# Patient Record
Sex: MALE | Race: WHITE | NOT HISPANIC OR LATINO | Employment: FULL TIME | URBAN - METROPOLITAN AREA
[De-identification: names, ages, dates, MRNs, and addresses within clinical notes are randomized per-mention and may not be internally consistent; named-entity substitution may affect disease eponyms.]

---

## 2023-07-10 ENCOUNTER — LAB VISIT (OUTPATIENT)
Dept: LAB | Facility: HOSPITAL | Age: 53
End: 2023-07-10
Attending: INTERNAL MEDICINE
Payer: COMMERCIAL

## 2023-07-10 ENCOUNTER — OFFICE VISIT (OUTPATIENT)
Dept: HEMATOLOGY/ONCOLOGY | Facility: CLINIC | Age: 53
End: 2023-07-10
Payer: COMMERCIAL

## 2023-07-10 VITALS
SYSTOLIC BLOOD PRESSURE: 115 MMHG | TEMPERATURE: 97 F | WEIGHT: 153.88 LBS | OXYGEN SATURATION: 98 % | HEIGHT: 68 IN | BODY MASS INDEX: 23.32 KG/M2 | HEART RATE: 81 BPM | DIASTOLIC BLOOD PRESSURE: 78 MMHG

## 2023-07-10 DIAGNOSIS — E78.5 HYPERLIPIDEMIA, UNSPECIFIED HYPERLIPIDEMIA TYPE: ICD-10-CM

## 2023-07-10 DIAGNOSIS — D69.6 THROMBOCYTOPENIA: ICD-10-CM

## 2023-07-10 DIAGNOSIS — K76.9 LIVER DISEASE, UNSPECIFIED: ICD-10-CM

## 2023-07-10 DIAGNOSIS — R74.01 TRANSAMINITIS: ICD-10-CM

## 2023-07-10 DIAGNOSIS — Z72.0 TOBACCO USE: ICD-10-CM

## 2023-07-10 DIAGNOSIS — K76.9 LIVER LESION: ICD-10-CM

## 2023-07-10 DIAGNOSIS — K76.9 LIVER LESION: Primary | ICD-10-CM

## 2023-07-10 DIAGNOSIS — F10.10 ALCOHOL ABUSE: ICD-10-CM

## 2023-07-10 DIAGNOSIS — I10 HYPERTENSION, UNSPECIFIED TYPE: ICD-10-CM

## 2023-07-10 LAB
AFP SERPL-MCNC: 2.4 NG/ML (ref 0–8.4)
ALBUMIN SERPL BCP-MCNC: 3.5 G/DL (ref 3.5–5.2)
ALP SERPL-CCNC: 89 U/L (ref 55–135)
ALT SERPL W/O P-5'-P-CCNC: 133 U/L (ref 10–44)
ANION GAP SERPL CALC-SCNC: 10 MMOL/L (ref 8–16)
AST SERPL-CCNC: 75 U/L (ref 10–40)
BASOPHILS # BLD AUTO: 0.04 K/UL (ref 0–0.2)
BASOPHILS NFR BLD: 0.7 % (ref 0–1.9)
BILIRUB SERPL-MCNC: 0.5 MG/DL (ref 0.1–1)
BUN SERPL-MCNC: 15 MG/DL (ref 6–20)
CALCIUM SERPL-MCNC: 9.4 MG/DL (ref 8.7–10.5)
CHLORIDE SERPL-SCNC: 104 MMOL/L (ref 95–110)
CO2 SERPL-SCNC: 24 MMOL/L (ref 23–29)
CREAT SERPL-MCNC: 0.9 MG/DL (ref 0.5–1.4)
DIFFERENTIAL METHOD: ABNORMAL
EOSINOPHIL # BLD AUTO: 0 K/UL (ref 0–0.5)
EOSINOPHIL NFR BLD: 0.5 % (ref 0–8)
ERYTHROCYTE [DISTWIDTH] IN BLOOD BY AUTOMATED COUNT: 14.6 % (ref 11.5–14.5)
EST. GFR  (NO RACE VARIABLE): >60 ML/MIN/1.73 M^2
GLUCOSE SERPL-MCNC: 111 MG/DL (ref 70–110)
HCT VFR BLD AUTO: 42.3 % (ref 40–54)
HGB BLD-MCNC: 14 G/DL (ref 14–18)
IMM GRANULOCYTES # BLD AUTO: 0.05 K/UL (ref 0–0.04)
IMM GRANULOCYTES NFR BLD AUTO: 0.9 % (ref 0–0.5)
INR PPP: 0.9 (ref 0.8–1.2)
LYMPHOCYTES # BLD AUTO: 2 K/UL (ref 1–4.8)
LYMPHOCYTES NFR BLD: 34.6 % (ref 18–48)
MCH RBC QN AUTO: 32.6 PG (ref 27–31)
MCHC RBC AUTO-ENTMCNC: 33.1 G/DL (ref 32–36)
MCV RBC AUTO: 98 FL (ref 82–98)
MONOCYTES # BLD AUTO: 0.7 K/UL (ref 0.3–1)
MONOCYTES NFR BLD: 11.8 % (ref 4–15)
NEUTROPHILS # BLD AUTO: 3 K/UL (ref 1.8–7.7)
NEUTROPHILS NFR BLD: 51.5 % (ref 38–73)
NRBC BLD-RTO: 0 /100 WBC
PATH REV BLD -IMP: NORMAL
PLATELET # BLD AUTO: 87 K/UL (ref 150–450)
PMV BLD AUTO: 10.5 FL (ref 9.2–12.9)
POTASSIUM SERPL-SCNC: 4.9 MMOL/L (ref 3.5–5.1)
PROT SERPL-MCNC: 7.7 G/DL (ref 6–8.4)
PROTHROMBIN TIME: 9.9 SEC (ref 9–12.5)
RBC # BLD AUTO: 4.3 M/UL (ref 4.6–6.2)
SODIUM SERPL-SCNC: 138 MMOL/L (ref 136–145)
WBC # BLD AUTO: 5.75 K/UL (ref 3.9–12.7)

## 2023-07-10 PROCEDURE — 1159F MED LIST DOCD IN RCRD: CPT | Mod: CPTII,S$GLB,, | Performed by: INTERNAL MEDICINE

## 2023-07-10 PROCEDURE — 99999 PR PBB SHADOW E&M-NEW PATIENT-LVL IV: ICD-10-PCS | Mod: PBBFAC,,, | Performed by: INTERNAL MEDICINE

## 2023-07-10 PROCEDURE — 99999 PR PBB SHADOW E&M-NEW PATIENT-LVL IV: CPT | Mod: PBBFAC,,, | Performed by: INTERNAL MEDICINE

## 2023-07-10 PROCEDURE — 85610 PROTHROMBIN TIME: CPT | Performed by: INTERNAL MEDICINE

## 2023-07-10 PROCEDURE — 99205 PR OFFICE/OUTPT VISIT, NEW, LEVL V, 60-74 MIN: ICD-10-PCS | Mod: S$GLB,,, | Performed by: INTERNAL MEDICINE

## 2023-07-10 PROCEDURE — 99417 PROLNG OP E/M EACH 15 MIN: CPT | Mod: S$GLB,,, | Performed by: INTERNAL MEDICINE

## 2023-07-10 PROCEDURE — 80053 COMPREHEN METABOLIC PANEL: CPT | Performed by: INTERNAL MEDICINE

## 2023-07-10 PROCEDURE — 85060 BLOOD SMEAR INTERPRETATION: CPT | Mod: ,,, | Performed by: PATHOLOGY

## 2023-07-10 PROCEDURE — 4010F ACE/ARB THERAPY RXD/TAKEN: CPT | Mod: CPTII,S$GLB,, | Performed by: INTERNAL MEDICINE

## 2023-07-10 PROCEDURE — 99417 PR PROLONGED SVC, OUTPT, W/WO DIRECT PT CONTACT,  EA ADDTL 15 MIN: ICD-10-PCS | Mod: S$GLB,,, | Performed by: INTERNAL MEDICINE

## 2023-07-10 PROCEDURE — 3074F PR MOST RECENT SYSTOLIC BLOOD PRESSURE < 130 MM HG: ICD-10-PCS | Mod: CPTII,S$GLB,, | Performed by: INTERNAL MEDICINE

## 2023-07-10 PROCEDURE — 36415 COLL VENOUS BLD VENIPUNCTURE: CPT | Performed by: INTERNAL MEDICINE

## 2023-07-10 PROCEDURE — 3074F SYST BP LT 130 MM HG: CPT | Mod: CPTII,S$GLB,, | Performed by: INTERNAL MEDICINE

## 2023-07-10 PROCEDURE — 3008F BODY MASS INDEX DOCD: CPT | Mod: CPTII,S$GLB,, | Performed by: INTERNAL MEDICINE

## 2023-07-10 PROCEDURE — 1160F PR REVIEW ALL MEDS BY PRESCRIBER/CLIN PHARMACIST DOCUMENTED: ICD-10-PCS | Mod: CPTII,S$GLB,, | Performed by: INTERNAL MEDICINE

## 2023-07-10 PROCEDURE — 3078F DIAST BP <80 MM HG: CPT | Mod: CPTII,S$GLB,, | Performed by: INTERNAL MEDICINE

## 2023-07-10 PROCEDURE — 3078F PR MOST RECENT DIASTOLIC BLOOD PRESSURE < 80 MM HG: ICD-10-PCS | Mod: CPTII,S$GLB,, | Performed by: INTERNAL MEDICINE

## 2023-07-10 PROCEDURE — 85025 COMPLETE CBC W/AUTO DIFF WBC: CPT | Performed by: INTERNAL MEDICINE

## 2023-07-10 PROCEDURE — 82105 ALPHA-FETOPROTEIN SERUM: CPT | Performed by: INTERNAL MEDICINE

## 2023-07-10 PROCEDURE — 4010F PR ACE/ARB THEARPY RXD/TAKEN: ICD-10-PCS | Mod: CPTII,S$GLB,, | Performed by: INTERNAL MEDICINE

## 2023-07-10 PROCEDURE — 3008F PR BODY MASS INDEX (BMI) DOCUMENTED: ICD-10-PCS | Mod: CPTII,S$GLB,, | Performed by: INTERNAL MEDICINE

## 2023-07-10 PROCEDURE — 99205 OFFICE O/P NEW HI 60 MIN: CPT | Mod: S$GLB,,, | Performed by: INTERNAL MEDICINE

## 2023-07-10 PROCEDURE — 85060 PATHOLOGIST REVIEW: ICD-10-PCS | Mod: ,,, | Performed by: PATHOLOGY

## 2023-07-10 PROCEDURE — 1159F PR MEDICATION LIST DOCUMENTED IN MEDICAL RECORD: ICD-10-PCS | Mod: CPTII,S$GLB,, | Performed by: INTERNAL MEDICINE

## 2023-07-10 PROCEDURE — 1160F RVW MEDS BY RX/DR IN RCRD: CPT | Mod: CPTII,S$GLB,, | Performed by: INTERNAL MEDICINE

## 2023-07-10 RX ORDER — LISINOPRIL 10 MG/1
10 TABLET ORAL DAILY
COMMUNITY
Start: 2023-04-19

## 2023-07-10 RX ORDER — PREDNISONE 10 MG/1
10 TABLET ORAL 2 TIMES DAILY
COMMUNITY
Start: 2023-07-07 | End: 2023-08-23

## 2023-07-10 NOTE — PROGRESS NOTES
Subjective:      DATE OF VISIT: 7/10/23     ?  Patient ID:?Juwan Martin is a 52 y.o. male.?? MR#: [id number]   ?   REFERRING PROVIDER: NJ Garcia  [address] [place] [address]     ? Primary Care Providers:  Primary Doctor No (General)     CHIEF COMPLAINT:  Thrombocytopenia, liver lesion abnormality on outside imaging????   ?   ONCOLOGIC DIAGNOSIS:  TBD  ?   CURRENT TREATMENT:  None    PAST TREATMENT:  None  ?   ONCOLOGIC HISTORY:   ?   Oncology History    No history exists.     Cancer Staging   No matching staging information was found for the patient.       HPI    Mr. Martin is a 52-year-old man with tobacco use, alcohol use 15 drinks per day for over 30 years, hyperlipidemia, hypertension without routine medical follow-up.  He had in May 2023 nasal laser procedure since then sinus infection and persistent fatigue for which he went to nurse practitioner.  Labs showed thrombocytopenia platelet count 30s K. He denies any evidence of bleeding.  He does endorse 20 lb weight loss over last 2 months.      Review of Systems    ?   A comprehensive 14-point review of systems was reviewed with patient and was negative other than as specified above.   ?   PAST MEDICAL HISTORY:   History reviewed. No pertinent past medical history. ?     PAST SURGICAL HISTORY:   No past surgical history on file.   ?   ALLERGIES:   Allergies as of 07/10/2023    (No Known Allergies)      ?   MEDICATIONS:?   Outpatient Medications Marked as Taking for the 7/10/23 encounter (Office Visit) with Charlotte Baltazar MD   Medication Sig Dispense Refill    lisinopriL 10 MG tablet Take 10 mg by mouth once daily.      predniSONE (DELTASONE) 10 MG tablet Take 10 mg by mouth 2 (two) times daily.        ?   SOCIAL HISTORY:?   Social History     Tobacco Use    Smoking status: Not on file    Smokeless tobacco: Not on file   Substance Use Topics    Alcohol use: Not on file      ?   Chewing tobacco   Fifteen drinks per day since 21 years  old   ?   FAMILY HISTORY:   family history is not on file.   ?        Objective:      Physical Exam      ?   Vitals:    07/10/23 1444   BP: 115/78   Pulse: 81   Temp: 97.2 °F (36.2 °C)      ?   ECOG:?0   General appearance: Generally well appearing, in no acute distress.   Head, eyes, ears, nose, and throat: Pupils round and equally reactive to light. Oropharynx clear with moist mucous membranes.  Facial erythema  Abdomen: Bowel sounds present, soft, nontender, nondistended.   Extremities: Warm, without edema.   Neurologic: Alert and oriented. Grossly normal strength, coordination, and gait.   Skin: No rashes, ecchymoses or petechial lesion.   ?      ?   Laboratory:  ?   No visits with results within 1 Day(s) from this visit.   Latest known visit with results is:   No results found for any previous visit.    Scan from outside June 2023  ?   Tumor markers   ? AFP pending  ?   Imaging:  ?  Scanned from outside ultrasound and CT June 2023  No results found for this or any previous visit (from the past 2160 hour(s)).  No results found for this or any previous visit (from the past 2160 hour(s)).  No results found for this or any previous visit (from the past 2160 hour(s)).      Pathology:    None     ?   Assessment/Plan:   Liver lesion  -     AFP - tumor marker; Future; Expected date: 07/10/2023  -     CBC W/ AUTO DIFFERENTIAL; Future; Expected date: 07/10/2023  -     Comprehensive Metabolic Panel; Future; Expected date: 07/10/2023  -     PT/INR; Future; Expected date: 07/10/2023    Thrombocytopenia  -     Pathologist Interpretation Differential; Future; Expected date: 07/10/2023    Alcohol abuse    Tobacco use    Hyperlipidemia, unspecified hyperlipidemia type    Hypertension, unspecified type    Transaminitis    Liver disease, unspecified  -     MRI Abdomen W WO Contrast; Future; Expected date: 07/10/2023       1. Liver lesion    2. Thrombocytopenia    3. Alcohol abuse    4. Tobacco use    5. Hyperlipidemia,  unspecified hyperlipidemia type    6. Hypertension, unspecified type    7. Transaminitis    8. Liver disease, unspecified          Plan:     Problem List Items Addressed This Visit    None  Visit Diagnoses       Liver lesion    -  Primary    Thrombocytopenia        Alcohol abuse        Tobacco use        Hyperlipidemia, unspecified hyperlipidemia type        Hypertension, unspecified type        Transaminitis        Liver disease, unspecified              Severe thrombocytopenia platelet count 30s K mild neutropenia in setting of transaminitis and significant alcohol use 15 drinks per day times 30 years, imaging findings possible hepatocellular steatosis unclear if cirrhosis or liver lesion concerning for liver disease associated thrombocytopenia.  No bleeding complication.    Given unclear imaging findings on ultrasound and follow-up CT on outside (do not have CD imaging) recommend MRI for follow-up.  I discussed risk of cirrhosis and association with malignancy.  He would benefit from establishing care with hepatology and will place consult.  Will get tumor marker AFP.  Concerning weight loss 20 lb over last 2 months.    Alcohol and tobacco cessation recommended.              Follow-Up:   Route Chart for Scheduling    Med Onc Chart Routing      Follow up with physician 2 weeks. after scan afternoon   Follow up with VERNELL    Infusion scheduling note    Injection scheduling note    Labs Other   Scheduling:  Preferred lab:  Lab interval:  labs today   Imaging MRI   early appt and fup with MD same day in afternoon due to travel   Pharmacy appointment    Other referrals           75 minutes of total time spent on the encounter, which includes face to face time and non-face to face time preparing to see the patient (eg, review of tests), Obtaining and/or reviewing separately obtained history, Documenting clinical information in the electronic or other health record, Independently interpreting results (not separately  reported) and communicating results to the patient/family/caregiver, or Care coordination (not separately reported).

## 2023-07-11 LAB — PATH REV BLD -IMP: NORMAL

## 2023-07-28 ENCOUNTER — OFFICE VISIT (OUTPATIENT)
Dept: HEMATOLOGY/ONCOLOGY | Facility: CLINIC | Age: 53
End: 2023-07-28
Payer: COMMERCIAL

## 2023-07-28 ENCOUNTER — HOSPITAL ENCOUNTER (OUTPATIENT)
Dept: RADIOLOGY | Facility: HOSPITAL | Age: 53
Discharge: HOME | End: 2023-07-28
Attending: INTERNAL MEDICINE
Payer: COMMERCIAL

## 2023-07-28 VITALS
DIASTOLIC BLOOD PRESSURE: 68 MMHG | HEIGHT: 67 IN | TEMPERATURE: 97 F | OXYGEN SATURATION: 98 % | HEART RATE: 93 BPM | SYSTOLIC BLOOD PRESSURE: 106 MMHG | BODY MASS INDEX: 23.36 KG/M2 | WEIGHT: 148.81 LBS

## 2023-07-28 DIAGNOSIS — D69.6 THROMBOCYTOPENIA: Primary | ICD-10-CM

## 2023-07-28 DIAGNOSIS — K76.9 LIVER DISEASE, UNSPECIFIED: ICD-10-CM

## 2023-07-28 DIAGNOSIS — R63.4 UNINTENTIONAL WEIGHT LOSS: ICD-10-CM

## 2023-07-28 PROCEDURE — 3074F SYST BP LT 130 MM HG: CPT | Mod: CPTII,S$GLB,, | Performed by: INTERNAL MEDICINE

## 2023-07-28 PROCEDURE — 99215 OFFICE O/P EST HI 40 MIN: CPT | Mod: S$GLB,,, | Performed by: INTERNAL MEDICINE

## 2023-07-28 PROCEDURE — 4010F ACE/ARB THERAPY RXD/TAKEN: CPT | Mod: CPTII,S$GLB,, | Performed by: INTERNAL MEDICINE

## 2023-07-28 PROCEDURE — 74183 MRI ABD W/O CNTR FLWD CNTR: CPT | Mod: 26,,, | Performed by: RADIOLOGY

## 2023-07-28 PROCEDURE — A9585 GADOBUTROL INJECTION: HCPCS | Performed by: INTERNAL MEDICINE

## 2023-07-28 PROCEDURE — 99999 PR PBB SHADOW E&M-EST. PATIENT-LVL III: ICD-10-PCS | Mod: PBBFAC,,, | Performed by: INTERNAL MEDICINE

## 2023-07-28 PROCEDURE — 3078F DIAST BP <80 MM HG: CPT | Mod: CPTII,S$GLB,, | Performed by: INTERNAL MEDICINE

## 2023-07-28 PROCEDURE — 4010F PR ACE/ARB THEARPY RXD/TAKEN: ICD-10-PCS | Mod: CPTII,S$GLB,, | Performed by: INTERNAL MEDICINE

## 2023-07-28 PROCEDURE — 99999 PR PBB SHADOW E&M-EST. PATIENT-LVL III: CPT | Mod: PBBFAC,,, | Performed by: INTERNAL MEDICINE

## 2023-07-28 PROCEDURE — 3078F PR MOST RECENT DIASTOLIC BLOOD PRESSURE < 80 MM HG: ICD-10-PCS | Mod: CPTII,S$GLB,, | Performed by: INTERNAL MEDICINE

## 2023-07-28 PROCEDURE — 1159F PR MEDICATION LIST DOCUMENTED IN MEDICAL RECORD: ICD-10-PCS | Mod: CPTII,S$GLB,, | Performed by: INTERNAL MEDICINE

## 2023-07-28 PROCEDURE — 25500020 PHARM REV CODE 255: Performed by: INTERNAL MEDICINE

## 2023-07-28 PROCEDURE — 74183 MRI ABDOMEN W WO CONTRAST: ICD-10-PCS | Mod: 26,,, | Performed by: RADIOLOGY

## 2023-07-28 PROCEDURE — 3008F PR BODY MASS INDEX (BMI) DOCUMENTED: ICD-10-PCS | Mod: CPTII,S$GLB,, | Performed by: INTERNAL MEDICINE

## 2023-07-28 PROCEDURE — 3074F PR MOST RECENT SYSTOLIC BLOOD PRESSURE < 130 MM HG: ICD-10-PCS | Mod: CPTII,S$GLB,, | Performed by: INTERNAL MEDICINE

## 2023-07-28 PROCEDURE — 74183 MRI ABD W/O CNTR FLWD CNTR: CPT | Mod: TC

## 2023-07-28 PROCEDURE — 1159F MED LIST DOCD IN RCRD: CPT | Mod: CPTII,S$GLB,, | Performed by: INTERNAL MEDICINE

## 2023-07-28 PROCEDURE — 3008F BODY MASS INDEX DOCD: CPT | Mod: CPTII,S$GLB,, | Performed by: INTERNAL MEDICINE

## 2023-07-28 PROCEDURE — 99215 PR OFFICE/OUTPT VISIT, EST, LEVL V, 40-54 MIN: ICD-10-PCS | Mod: S$GLB,,, | Performed by: INTERNAL MEDICINE

## 2023-07-28 RX ORDER — GADOBUTROL 604.72 MG/ML
10 INJECTION INTRAVENOUS
Status: COMPLETED | OUTPATIENT
Start: 2023-07-28 | End: 2023-07-28

## 2023-07-28 RX ADMIN — GADOBUTROL 6 ML: 604.72 INJECTION INTRAVENOUS at 11:07

## 2023-07-28 NOTE — H&P (VIEW-ONLY)
Subjective:      DATE OF VISIT: 7/28/23     ?  Patient ID:?Juwan Martin is a 52 y.o. male.?? MR#: [id number]   ?   REFERRING PROVIDER: No referring provider defined for this encounter.     ? Primary Care Providers:  Primary Doctor No (General)     CHIEF COMPLAINT:  Thrombocytopenia, liver lesion abnormality on outside imaging????   ?   ONCOLOGIC DIAGNOSIS:  TBD  ?   CURRENT TREATMENT:  None    PAST TREATMENT:  None  ?   ONCOLOGIC HISTORY:   ?   Oncology History    No history exists.      Cancer Staging   No matching staging information was found for the patient.       HPI    Mr. Martin is a 52-year-old man with tobacco use, alcohol use 15 drinks per day for over 30 years, hyperlipidemia, hypertension without routine medical follow-up.  He had in May 2023 nasal laser procedure since then sinus infection and persistent fatigue for which he went to nurse practitioner.  Labs showed thrombocytopenia platelet count 30s K side.  20 lb weight loss over the prior 2 months.  Since our last visit he notes additional 5 lb unintentional weight loss continued fatigue.  Easy bruisability no other bleeding.  He had further workup with MRI abdomen tumor marker and CBC with pathology smear review.  August 2023 hepatology referral with Dr. Owens as planned.       Review of Systems    ?   A comprehensive 14-point review of systems was reviewed with patient and was negative other than as specified above.   ?   PAST MEDICAL HISTORY:   Past Medical History:   Diagnosis Date    Hyperlipidemia     Hypertension     ?     PAST SURGICAL HISTORY:   No past surgical history on file.   ?   ALLERGIES:   Allergies as of 07/28/2023    (No Known Allergies)      ?   MEDICATIONS:?   Outpatient Medications Marked as Taking for the 7/28/23 encounter (Office Visit) with Charlotte Baltazar MD   Medication Sig Dispense Refill    lisinopriL 10 MG tablet Take 10 mg by mouth once daily.        ?   SOCIAL HISTORY:?   Social History     Tobacco Use     Smoking status: Not on file    Smokeless tobacco: Current     Types: Chew   Substance Use Topics    Alcohol use: Yes     Alcohol/week: 15.0 standard drinks     Types: 15 Drinks containing 0.5 oz of alcohol per week      ?   Chewing tobacco   Fifteen drinks per day since 21 years old   ?   FAMILY HISTORY:   family history is not on file.   ?        Objective:      Physical Exam      ?   Vitals:    07/28/23 1419   BP: 106/68   Pulse: 93   Temp: 97.2 °F (36.2 °C)        ?   ECOG:?0   General appearance: Generally well appearing, in no acute distress.   Head, eyes, ears, nose, and throat: Pupils round and equally reactive to light. Oropharynx clear with moist mucous membranes.  Facial erythema  Abdomen: Bowel sounds present, soft, nontender, nondistended.   Extremities: Warm, without edema.   Neurologic: Alert and oriented. Grossly normal strength, coordination, and gait.   Skin: No rashes, ecchymoses or petechial lesion.   ?      ?   Laboratory:  ?   No visits with results within 1 Day(s) from this visit.   Latest known visit with results is:   Lab Visit on 07/10/2023   Component Date Value Ref Range Status    AFP 07/10/2023 2.4  0.0 - 8.4 ng/mL Final    WBC 07/10/2023 5.75  3.90 - 12.70 K/uL Final    RBC 07/10/2023 4.30 (L)  4.60 - 6.20 M/uL Final    Hemoglobin 07/10/2023 14.0  14.0 - 18.0 g/dL Final    Hematocrit 07/10/2023 42.3  40.0 - 54.0 % Final    MCV 07/10/2023 98  82 - 98 fL Final    MCH 07/10/2023 32.6 (H)  27.0 - 31.0 pg Final    MCHC 07/10/2023 33.1  32.0 - 36.0 g/dL Final    RDW 07/10/2023 14.6 (H)  11.5 - 14.5 % Final    Platelets 07/10/2023 87 (L)  150 - 450 K/uL Final    MPV 07/10/2023 10.5  9.2 - 12.9 fL Final    Immature Granulocytes 07/10/2023 0.9 (H)  0.0 - 0.5 % Final    Gran # (ANC) 07/10/2023 3.0  1.8 - 7.7 K/uL Final    Immature Grans (Abs) 07/10/2023 0.05 (H)  0.00 - 0.04 K/uL Final    Lymph # 07/10/2023 2.0  1.0 - 4.8 K/uL Final    Mono # 07/10/2023 0.7  0.3 - 1.0 K/uL Final    Eos #  07/10/2023 0.0  0.0 - 0.5 K/uL Final    Baso # 07/10/2023 0.04  0.00 - 0.20 K/uL Final    nRBC 07/10/2023 0  0 /100 WBC Final    Gran % 07/10/2023 51.5  38.0 - 73.0 % Final    Lymph % 07/10/2023 34.6  18.0 - 48.0 % Final    Mono % 07/10/2023 11.8  4.0 - 15.0 % Final    Eosinophil % 07/10/2023 0.5  0.0 - 8.0 % Final    Basophil % 07/10/2023 0.7  0.0 - 1.9 % Final    Differential Method 07/10/2023 Automated   Final    Sodium 07/10/2023 138  136 - 145 mmol/L Final    Potassium 07/10/2023 4.9  3.5 - 5.1 mmol/L Final    Chloride 07/10/2023 104  95 - 110 mmol/L Final    CO2 07/10/2023 24  23 - 29 mmol/L Final    Glucose 07/10/2023 111 (H)  70 - 110 mg/dL Final    BUN 07/10/2023 15  6 - 20 mg/dL Final    Creatinine 07/10/2023 0.9  0.5 - 1.4 mg/dL Final    Calcium 07/10/2023 9.4  8.7 - 10.5 mg/dL Final    Total Protein 07/10/2023 7.7  6.0 - 8.4 g/dL Final    Albumin 07/10/2023 3.5  3.5 - 5.2 g/dL Final    Total Bilirubin 07/10/2023 0.5  0.1 - 1.0 mg/dL Final    Alkaline Phosphatase 07/10/2023 89  55 - 135 U/L Final    AST 07/10/2023 75 (H)  10 - 40 U/L Final    ALT 07/10/2023 133 (H)  10 - 44 U/L Final    eGFR 07/10/2023 >60  >60 mL/min/1.73 m^2 Final    Anion Gap 07/10/2023 10  8 - 16 mmol/L Final    Prothrombin Time 07/10/2023 9.9  9.0 - 12.5 sec Final    INR 07/10/2023 0.9  0.8 - 1.2 Final    Pathologist Review 07/10/2023 Review required   Final    Pathologist Review Peripheral Smear 07/10/2023 REVIEWED   Final    Scan from outside June 2023  ?   Tumor markers   ? AFP pending  ?   Imaging:  ?  Scanned from outside ultrasound and CT June 2023  No results found for this or any previous visit (from the past 2160 hour(s)).  Results for orders placed or performed during the hospital encounter of 07/28/23 (from the past 2160 hour(s))   MRI Abdomen W WO Contrast    Impression    1. No focal hepatic lesion is seen on this exam.  2. Normal size liver.  No evidence for portal venous hypertension.  3. No biliary ductal  dilatation.      Electronically signed by: Rishi Tabares MD  Date:    07/28/2023  Time:    13:28     No results found for this or any previous visit (from the past 2160 hour(s)).      Pathology:    None     ?   Assessment/Plan:   Thrombocytopenia  -     Chromosome Analysis, Bone Marrow; Future; Expected date: 07/28/2023  -     Leukemia/Lymphoma Screen - Bone Marrow Right Posterior Iliac Crest; Future; Expected date: 07/28/2023  -     CT Biopsy Bone Marrow (xpd); Future; Expected date: 07/28/2023  -     Specimen to Pathology, Bone Marrow Aspiration/Biopsy  -     CBC W/ AUTO DIFFERENTIAL; Future; Expected date: 07/28/2023  -     CT Chest With Contrast; Future; Expected date: 07/28/2023    Unintentional weight loss  -     Chromosome Analysis, Bone Marrow; Future; Expected date: 07/28/2023  -     Leukemia/Lymphoma Screen - Bone Marrow Right Posterior Iliac Crest; Future; Expected date: 07/28/2023  -     CT Biopsy Bone Marrow (xpd); Future; Expected date: 07/28/2023  -     Specimen to Pathology, Bone Marrow Aspiration/Biopsy  -     CBC W/ AUTO DIFFERENTIAL; Future; Expected date: 07/28/2023  -     CT Chest With Contrast; Future; Expected date: 07/28/2023         1. Thrombocytopenia    2. Unintentional weight loss            Plan:     Problem List Items Addressed This Visit    None  Visit Diagnoses       Thrombocytopenia    -  Primary    Unintentional weight loss              Thrombocytopenia:  Episode of epistaxis and easy bruisability.  Platelet count 30s on outside on repeat here 80s.  Peripheral smear review with isolated cytopenia no other morphologic abnormalities noted.  Evaluation with MRI liver was without evidence of focal liver lesion no noted cirrhosis signs of portal vein hypertension or splenomegaly.  Unclear etiology of thrombocytopenia at this time.   In the context of significant unintentional weight loss and fatigue recommend further workup with bone marrow biopsy.    Transaminitis:  Given unclear imaging  findings on ultrasound and follow-up CT on outside (do not have CD imaging) recommend MRI for follow-up, see above, without focal liver lesion.  AFP negative.  Recommend follow-up with hepatology for transaminitis.    Alcohol and tobacco use:  cessation recommended     Follow-Up:   Route Chart for Scheduling    Med Onc Chart Routing      Follow up with physician 4 weeks. after bmbx results   Follow up with VERNE[facility]    Infusion scheduling note    Injection scheduling note    Labs    Imaging Other   bmbx with cbc same day prior; CT chest with contrast same day due to pateint travel   Pharmacy appointment    Other referrals            40 minutes of total time spent on the encounter, which includes face to face time and non-face to face time preparing to see the patient (eg, review of tests), Obtaining and/or reviewing separately obtained history, Documenting clinical information in the electronic or other health record, Independently interpreting results (not separately reported) and communicating results to the patient/family/caregiver, or Care coordination (not separately reported).

## 2023-07-28 NOTE — PROGRESS NOTES
Subjective:      DATE OF VISIT: 7/28/23     ?  Patient ID:?Juwan Martin is a 52 y.o. male.?? MR#: [id number]   ?   REFERRING PROVIDER: No referring provider defined for this encounter.     ? Primary Care Providers:  Primary Doctor No (General)     CHIEF COMPLAINT:  Thrombocytopenia, liver lesion abnormality on outside imaging????   ?   ONCOLOGIC DIAGNOSIS:  TBD  ?   CURRENT TREATMENT:  None    PAST TREATMENT:  None  ?   ONCOLOGIC HISTORY:   ?   Oncology History    No history exists.      Cancer Staging   No matching staging information was found for the patient.       HPI    Mr. Martin is a 52-year-old man with tobacco use, alcohol use 15 drinks per day for over 30 years, hyperlipidemia, hypertension without routine medical follow-up.  He had in May 2023 nasal laser procedure since then sinus infection and persistent fatigue for which he went to nurse practitioner.  Labs showed thrombocytopenia platelet count 30s K side.  20 lb weight loss over the prior 2 months.  Since our last visit he notes additional 5 lb unintentional weight loss continued fatigue.  Easy bruisability no other bleeding.  He had further workup with MRI abdomen tumor marker and CBC with pathology smear review.  August 2023 hepatology referral with Dr. Owens as planned.       Review of Systems    ?   A comprehensive 14-point review of systems was reviewed with patient and was negative other than as specified above.   ?   PAST MEDICAL HISTORY:   Past Medical History:   Diagnosis Date    Hyperlipidemia     Hypertension     ?     PAST SURGICAL HISTORY:   No past surgical history on file.   ?   ALLERGIES:   Allergies as of 07/28/2023    (No Known Allergies)      ?   MEDICATIONS:?   Outpatient Medications Marked as Taking for the 7/28/23 encounter (Office Visit) with Charlotte Baltazar MD   Medication Sig Dispense Refill    lisinopriL 10 MG tablet Take 10 mg by mouth once daily.        ?   SOCIAL HISTORY:?   Social History     Tobacco Use     Smoking status: Not on file    Smokeless tobacco: Current     Types: Chew   Substance Use Topics    Alcohol use: Yes     Alcohol/week: 15.0 standard drinks     Types: 15 Drinks containing 0.5 oz of alcohol per week      ?   Chewing tobacco   Fifteen drinks per day since 21 years old   ?   FAMILY HISTORY:   family history is not on file.   ?        Objective:      Physical Exam      ?   Vitals:    07/28/23 1419   BP: 106/68   Pulse: 93   Temp: 97.2 °F (36.2 °C)        ?   ECOG:?0   General appearance: Generally well appearing, in no acute distress.   Head, eyes, ears, nose, and throat: Pupils round and equally reactive to light. Oropharynx clear with moist mucous membranes.  Facial erythema  Abdomen: Bowel sounds present, soft, nontender, nondistended.   Extremities: Warm, without edema.   Neurologic: Alert and oriented. Grossly normal strength, coordination, and gait.   Skin: No rashes, ecchymoses or petechial lesion.   ?      ?   Laboratory:  ?   No visits with results within 1 Day(s) from this visit.   Latest known visit with results is:   Lab Visit on 07/10/2023   Component Date Value Ref Range Status    AFP 07/10/2023 2.4  0.0 - 8.4 ng/mL Final    WBC 07/10/2023 5.75  3.90 - 12.70 K/uL Final    RBC 07/10/2023 4.30 (L)  4.60 - 6.20 M/uL Final    Hemoglobin 07/10/2023 14.0  14.0 - 18.0 g/dL Final    Hematocrit 07/10/2023 42.3  40.0 - 54.0 % Final    MCV 07/10/2023 98  82 - 98 fL Final    MCH 07/10/2023 32.6 (H)  27.0 - 31.0 pg Final    MCHC 07/10/2023 33.1  32.0 - 36.0 g/dL Final    RDW 07/10/2023 14.6 (H)  11.5 - 14.5 % Final    Platelets 07/10/2023 87 (L)  150 - 450 K/uL Final    MPV 07/10/2023 10.5  9.2 - 12.9 fL Final    Immature Granulocytes 07/10/2023 0.9 (H)  0.0 - 0.5 % Final    Gran # (ANC) 07/10/2023 3.0  1.8 - 7.7 K/uL Final    Immature Grans (Abs) 07/10/2023 0.05 (H)  0.00 - 0.04 K/uL Final    Lymph # 07/10/2023 2.0  1.0 - 4.8 K/uL Final    Mono # 07/10/2023 0.7  0.3 - 1.0 K/uL Final    Eos #  07/10/2023 0.0  0.0 - 0.5 K/uL Final    Baso # 07/10/2023 0.04  0.00 - 0.20 K/uL Final    nRBC 07/10/2023 0  0 /100 WBC Final    Gran % 07/10/2023 51.5  38.0 - 73.0 % Final    Lymph % 07/10/2023 34.6  18.0 - 48.0 % Final    Mono % 07/10/2023 11.8  4.0 - 15.0 % Final    Eosinophil % 07/10/2023 0.5  0.0 - 8.0 % Final    Basophil % 07/10/2023 0.7  0.0 - 1.9 % Final    Differential Method 07/10/2023 Automated   Final    Sodium 07/10/2023 138  136 - 145 mmol/L Final    Potassium 07/10/2023 4.9  3.5 - 5.1 mmol/L Final    Chloride 07/10/2023 104  95 - 110 mmol/L Final    CO2 07/10/2023 24  23 - 29 mmol/L Final    Glucose 07/10/2023 111 (H)  70 - 110 mg/dL Final    BUN 07/10/2023 15  6 - 20 mg/dL Final    Creatinine 07/10/2023 0.9  0.5 - 1.4 mg/dL Final    Calcium 07/10/2023 9.4  8.7 - 10.5 mg/dL Final    Total Protein 07/10/2023 7.7  6.0 - 8.4 g/dL Final    Albumin 07/10/2023 3.5  3.5 - 5.2 g/dL Final    Total Bilirubin 07/10/2023 0.5  0.1 - 1.0 mg/dL Final    Alkaline Phosphatase 07/10/2023 89  55 - 135 U/L Final    AST 07/10/2023 75 (H)  10 - 40 U/L Final    ALT 07/10/2023 133 (H)  10 - 44 U/L Final    eGFR 07/10/2023 >60  >60 mL/min/1.73 m^2 Final    Anion Gap 07/10/2023 10  8 - 16 mmol/L Final    Prothrombin Time 07/10/2023 9.9  9.0 - 12.5 sec Final    INR 07/10/2023 0.9  0.8 - 1.2 Final    Pathologist Review 07/10/2023 Review required   Final    Pathologist Review Peripheral Smear 07/10/2023 REVIEWED   Final    Scan from outside June 2023  ?   Tumor markers   ? AFP pending  ?   Imaging:  ?  Scanned from outside ultrasound and CT June 2023  No results found for this or any previous visit (from the past 2160 hour(s)).  Results for orders placed or performed during the hospital encounter of 07/28/23 (from the past 2160 hour(s))   MRI Abdomen W WO Contrast    Impression    1. No focal hepatic lesion is seen on this exam.  2. Normal size liver.  No evidence for portal venous hypertension.  3. No biliary ductal  dilatation.      Electronically signed by: Rishi Tabares MD  Date:    07/28/2023  Time:    13:28     No results found for this or any previous visit (from the past 2160 hour(s)).      Pathology:    None     ?   Assessment/Plan:   Thrombocytopenia  -     Chromosome Analysis, Bone Marrow; Future; Expected date: 07/28/2023  -     Leukemia/Lymphoma Screen - Bone Marrow Right Posterior Iliac Crest; Future; Expected date: 07/28/2023  -     CT Biopsy Bone Marrow (xpd); Future; Expected date: 07/28/2023  -     Specimen to Pathology, Bone Marrow Aspiration/Biopsy  -     CBC W/ AUTO DIFFERENTIAL; Future; Expected date: 07/28/2023  -     CT Chest With Contrast; Future; Expected date: 07/28/2023    Unintentional weight loss  -     Chromosome Analysis, Bone Marrow; Future; Expected date: 07/28/2023  -     Leukemia/Lymphoma Screen - Bone Marrow Right Posterior Iliac Crest; Future; Expected date: 07/28/2023  -     CT Biopsy Bone Marrow (xpd); Future; Expected date: 07/28/2023  -     Specimen to Pathology, Bone Marrow Aspiration/Biopsy  -     CBC W/ AUTO DIFFERENTIAL; Future; Expected date: 07/28/2023  -     CT Chest With Contrast; Future; Expected date: 07/28/2023         1. Thrombocytopenia    2. Unintentional weight loss            Plan:     Problem List Items Addressed This Visit    None  Visit Diagnoses       Thrombocytopenia    -  Primary    Unintentional weight loss              Thrombocytopenia:  Episode of epistaxis and easy bruisability.  Platelet count 30s on outside on repeat here 80s.  Peripheral smear review with isolated cytopenia no other morphologic abnormalities noted.  Evaluation with MRI liver was without evidence of focal liver lesion no noted cirrhosis signs of portal vein hypertension or splenomegaly.  Unclear etiology of thrombocytopenia at this time.   In the context of significant unintentional weight loss and fatigue recommend further workup with bone marrow biopsy.    Transaminitis:  Given unclear imaging  findings on ultrasound and follow-up CT on outside (do not have CD imaging) recommend MRI for follow-up, see above, without focal liver lesion.  AFP negative.  Recommend follow-up with hepatology for transaminitis.    Alcohol and tobacco use:  cessation recommended     Follow-Up:   Route Chart for Scheduling    Med Onc Chart Routing      Follow up with physician 4 weeks. after bmbx results   Follow up with [facility]NE[facility]    Infusion scheduling note    Injection scheduling note    Labs    Imaging Other   bmbx with cbc same day prior; CT chest with contrast same day due to pateint travel   Pharmacy appointment    Other referrals            40 minutes of total time spent on the encounter, which includes face to face time and non-face to face time preparing to see the patient (eg, review of tests), Obtaining and/or reviewing separately obtained history, Documenting clinical information in the electronic or other health record, Independently interpreting results (not separately reported) and communicating results to the patient/family/caregiver, or Care coordination (not separately reported).

## 2023-08-04 ENCOUNTER — TELEPHONE (OUTPATIENT)
Dept: RADIOLOGY | Facility: HOSPITAL | Age: 53
End: 2023-08-04
Payer: COMMERCIAL

## 2023-08-04 NOTE — TELEPHONE ENCOUNTER
Interventional Radiology:    Spoke to pt and states that he has to check with his wife first and will call me back to get scheduled.

## 2023-08-08 ENCOUNTER — TELEPHONE (OUTPATIENT)
Dept: HEMATOLOGY/ONCOLOGY | Facility: CLINIC | Age: 53
End: 2023-08-08
Payer: COMMERCIAL

## 2023-08-08 NOTE — TELEPHONE ENCOUNTER
----- Message from Rajni Mills sent at 8/8/2023  3:42 PM CDT -----  Regarding: pt call back  Name of Who is Calling:LEV GIPSON [[id number]]           What is the request in detail: pt call back            Can the clinic reply by [facility]:           [facility]: [phone]

## 2023-08-08 NOTE — TELEPHONE ENCOUNTER
----- Message from Gemma Dias sent at 8/8/2023 12:42 PM CDT -----  Contact: daughter  Pt daughter Ellen is asking for an return call in reference to fever ,and chills dad has, states he went to see NP and was prescribed an antibiotic and is just worried  was tested for Covid but test was negative ,please call back at [phone] Thx CJ

## 2023-08-08 NOTE — TELEPHONE ENCOUNTER
Spoke to pt and he wanted to inform that he had been seen by NP and was running 104 temp. Negative for Covid so unsure of the origin.

## 2023-08-14 DIAGNOSIS — K76.9 LIVER DISEASE, UNSPECIFIED: ICD-10-CM

## 2023-08-14 DIAGNOSIS — D68.9 COAGULATION DEFECT, UNSPECIFIED: Primary | ICD-10-CM

## 2023-08-17 ENCOUNTER — TELEPHONE (OUTPATIENT)
Dept: RADIOLOGY | Facility: HOSPITAL | Age: 53
End: 2023-08-17
Payer: COMMERCIAL

## 2023-08-17 NOTE — TELEPHONE ENCOUNTER
Called patient and confirmed radiology procedure appointment for 8/18/23 at 1030. Instructed pt to be NPO after midnight tonight, take BP meds in morning with a few sips of water only, arrive to [facility] on [address] at 0930, and have a ride home post procedure. Pt verbalized understanding and had all questions answered. Pt confirmed no blood thinners are being taken

## 2023-08-18 ENCOUNTER — HOSPITAL ENCOUNTER (OUTPATIENT)
Dept: RADIOLOGY | Facility: HOSPITAL | Age: 53
Discharge: HOME | End: 2023-08-18
Attending: INTERNAL MEDICINE
Payer: COMMERCIAL

## 2023-08-18 VITALS
DIASTOLIC BLOOD PRESSURE: 70 MMHG | WEIGHT: 148 LBS | SYSTOLIC BLOOD PRESSURE: 110 MMHG | BODY MASS INDEX: 23.23 KG/M2 | HEIGHT: 67 IN | HEART RATE: 68 BPM | RESPIRATION RATE: 14 BRPM | OXYGEN SATURATION: 97 %

## 2023-08-18 DIAGNOSIS — D69.6 THROMBOCYTOPENIA: ICD-10-CM

## 2023-08-18 DIAGNOSIS — R63.4 UNINTENTIONAL WEIGHT LOSS: ICD-10-CM

## 2023-08-18 PROCEDURE — 88313 SPECIAL STAINS GROUP 2: CPT | Mod: 59 | Performed by: PATHOLOGY

## 2023-08-18 PROCEDURE — 71260 CT CHEST WITH CONTRAST: ICD-10-PCS | Mod: 26,,, | Performed by: RADIOLOGY

## 2023-08-18 PROCEDURE — 71260 CT THORAX DX C+: CPT | Mod: TC

## 2023-08-18 PROCEDURE — 88313 SPECIAL STAINS GROUP 2: CPT | Mod: 26,,, | Performed by: PATHOLOGY

## 2023-08-18 PROCEDURE — 88342 IMHCHEM/IMCYTCHM 1ST ANTB: CPT | Mod: 26,59,, | Performed by: PATHOLOGY

## 2023-08-18 PROCEDURE — 77012 CT SCAN FOR NEEDLE BIOPSY: CPT | Mod: TC

## 2023-08-18 PROCEDURE — 88189 PR  FLOWCYTOMETRY/READ, 16 & > MARKERS: ICD-10-PCS | Mod: ,,, | Performed by: PATHOLOGY

## 2023-08-18 PROCEDURE — 88341 IMHCHEM/IMCYTCHM EA ADD ANTB: CPT | Mod: 59 | Performed by: PATHOLOGY

## 2023-08-18 PROCEDURE — 88341 IMHCHEM/IMCYTCHM EA ADD ANTB: CPT | Mod: 26,59,, | Performed by: PATHOLOGY

## 2023-08-18 PROCEDURE — 88184 FLOWCYTOMETRY/ TC 1 MARKER: CPT | Performed by: PATHOLOGY

## 2023-08-18 PROCEDURE — 88185 FLOWCYTOMETRY/TC ADD-ON: CPT | Mod: 59 | Performed by: PATHOLOGY

## 2023-08-18 PROCEDURE — 25500020 PHARM REV CODE 255: Performed by: INTERNAL MEDICINE

## 2023-08-18 PROCEDURE — 88313 PR  SPECIAL STAINS,GROUP II: ICD-10-PCS | Mod: 26,,, | Performed by: PATHOLOGY

## 2023-08-18 PROCEDURE — 25000003 PHARM REV CODE 250: Performed by: RADIOLOGY

## 2023-08-18 PROCEDURE — 88305 TISSUE EXAM BY PATHOLOGIST: CPT | Mod: 59 | Performed by: PATHOLOGY

## 2023-08-18 PROCEDURE — 88189 FLOWCYTOMETRY/READ 16 & >: CPT | Mod: ,,, | Performed by: PATHOLOGY

## 2023-08-18 PROCEDURE — 88311 DECALCIFY TISSUE: CPT | Performed by: PATHOLOGY

## 2023-08-18 PROCEDURE — 71260 CT THORAX DX C+: CPT | Mod: 26,,, | Performed by: RADIOLOGY

## 2023-08-18 PROCEDURE — 63600175 PHARM REV CODE 636 W HCPCS: Performed by: RADIOLOGY

## 2023-08-18 PROCEDURE — 38221 DX BONE MARROW BIOPSIES: CPT | Mod: RT,,, | Performed by: RADIOLOGY

## 2023-08-18 PROCEDURE — 88305 TISSUE EXAM BY PATHOLOGIST: ICD-10-PCS | Mod: 26,,, | Performed by: PATHOLOGY

## 2023-08-18 PROCEDURE — 38221 CT BIOPSY BONE MARROW (XPD): ICD-10-PCS | Mod: RT,,, | Performed by: RADIOLOGY

## 2023-08-18 PROCEDURE — 85097 BONE MARROW INTERPRETATION: CPT | Mod: ,,, | Performed by: PATHOLOGY

## 2023-08-18 PROCEDURE — 77012 CT SCAN FOR NEEDLE BIOPSY: CPT | Mod: 26,,, | Performed by: RADIOLOGY

## 2023-08-18 PROCEDURE — 88341 PR IHC OR ICC EACH ADD'L SINGLE ANTIBODY  STAINPR: ICD-10-PCS | Mod: 26,59,, | Performed by: PATHOLOGY

## 2023-08-18 PROCEDURE — 88264 CHROMOSOME ANALYSIS 20-25: CPT | Performed by: INTERNAL MEDICINE

## 2023-08-18 PROCEDURE — 88342 CHG IMMUNOCYTOCHEMISTRY: ICD-10-PCS | Mod: 26,59,, | Performed by: PATHOLOGY

## 2023-08-18 PROCEDURE — 88342 IMHCHEM/IMCYTCHM 1ST ANTB: CPT | Performed by: PATHOLOGY

## 2023-08-18 PROCEDURE — 85097 PR  BONE MARROW,SMEAR INTERPRETATION: ICD-10-PCS | Mod: ,,, | Performed by: PATHOLOGY

## 2023-08-18 PROCEDURE — 77012 CT BIOPSY BONE MARROW (XPD): ICD-10-PCS | Mod: 26,,, | Performed by: RADIOLOGY

## 2023-08-18 PROCEDURE — 88237 TISSUE CULTURE BONE MARROW: CPT | Performed by: INTERNAL MEDICINE

## 2023-08-18 PROCEDURE — 88305 TISSUE EXAM BY PATHOLOGIST: CPT | Mod: 26,,, | Performed by: PATHOLOGY

## 2023-08-18 RX ORDER — LIDOCAINE HYDROCHLORIDE 10 MG/ML
INJECTION INFILTRATION; PERINEURAL CODE/TRAUMA/SEDATION MEDICATION
Status: COMPLETED | OUTPATIENT
Start: 2023-08-18 | End: 2023-08-18

## 2023-08-18 RX ORDER — MIDAZOLAM HYDROCHLORIDE 1 MG/ML
INJECTION INTRAMUSCULAR; INTRAVENOUS CODE/TRAUMA/SEDATION MEDICATION
Status: COMPLETED | OUTPATIENT
Start: 2023-08-18 | End: 2023-08-18

## 2023-08-18 RX ORDER — FENTANYL CITRATE 50 UG/ML
INJECTION, SOLUTION INTRAMUSCULAR; INTRAVENOUS CODE/TRAUMA/SEDATION MEDICATION
Status: COMPLETED | OUTPATIENT
Start: 2023-08-18 | End: 2023-08-18

## 2023-08-18 RX ADMIN — MIDAZOLAM HYDROCHLORIDE 1 MG: 1 INJECTION, SOLUTION INTRAMUSCULAR; INTRAVENOUS at 11:08

## 2023-08-18 RX ADMIN — IOHEXOL 100 ML: 350 INJECTION, SOLUTION INTRAVENOUS at 09:08

## 2023-08-18 RX ADMIN — FENTANYL CITRATE 50 MCG: 50 INJECTION, SOLUTION INTRAMUSCULAR; INTRAVENOUS at 11:08

## 2023-08-18 RX ADMIN — LIDOCAINE HYDROCHLORIDE 5 ML: 10 INJECTION, SOLUTION INFILTRATION; PERINEURAL at 11:08

## 2023-08-18 NOTE — PLAN OF CARE
Band aid to right posterior iliac crest puncture site C/D/I with no bleeding/redness/swelling noted. VSS, NADN, and pt meets criteria for discharge. Discharge instructions given to and reviewed with pt, and pt verbalized understanding of all. Pt discharged to home, taken out via wheelchair and driven home by wife.

## 2023-08-18 NOTE — DISCHARGE SUMMARY
[facility]Hospital)  Discharge Note  Short Stay    CT Biopsy Bone Marrow (xpd)      OUTCOME: Patient tolerated treatment/procedure well without complication and is now ready for discharge.    DISPOSITION: Home or Self Care    FINAL DIAGNOSIS:  <principal problem not specified>    FOLLOWUP: In clinic    DISCHARGE INSTRUCTIONS:  No discharge procedures on file.     TIME SPENT ON DISCHARGE: 15 minutes    Pre Op Diagnosis: thrombocytopenia     Post Op Diagnosis: same     Procedure:  Bone marrow biopsy     Procedure performed by: Vivi FOREMAN, Al VALLADARES     Written Informed Consent Obtained: Yes     Specimen Removed:  yes     Estimated Blood Loss:  minimal     Findings: Local anesthesia and moderate sedation were used.     The patient tolerated the procedure well and there were no complications.      Disposition:  F/U in clinic    Discharge instructions:  Light activity for 24 hours.  Remove band aid in 24 hours.  No baths (showers are appropriate).    F/U with ordering physician    Sterile technique was performed in the right iliac, lidocaine was used as a local anesthetic.  Multiple samples taken percutaneously from the right iliac bone.  Pt tolerated the procedure well without immediate complications.  Please see radiologist report for details. F/u with PCP and/or ordering physician.

## 2023-08-18 NOTE — PLAN OF CARE
Pt ambulated independently from waiting room to pre-procedural area. Pt is AOx4, denies pain, and NADN. Pt changed into hospital gown and positioned self on stretcher for comfort. Warm blanket provided. Pt's wife at bedside.

## 2023-08-18 NOTE — DISCHARGE INSTRUCTIONS
Please return to ER if any of these symptoms occur:  Fever over 101 degrees,  Bleeding from the puncture site not controlled, (Hold pressure for 5 minutes, if bleeding does not stop then go to the ER.)  Pain not controlled with Aleve or Tylenol,    No driving for 24 hours after procedure due to sedation given during procedure.     Do not lift anything heavy, nothing over the size of a gallon of milk, for at least 2 days.    Do not submerge in standing water for 2 days after biopsy but you may shower.    Resume home medications and diet    Biopsy results will be with Dr. Baltazar in 5-7 days, please follow up with him for results and any other questions or concerns that you may have.

## 2023-08-22 LAB
BODY SITE - BONE MARROW: NORMAL
CLINICAL DIAGNOSIS - BONE MARROW: NORMAL
FLOW CYTOMETRY ANTIBODIES ANALYZED - BONE MARROW: NORMAL
FLOW CYTOMETRY COMMENT - BONE MARROW: NORMAL
FLOW CYTOMETRY INTERPRETATION - BONE MARROW: NORMAL

## 2023-08-23 ENCOUNTER — OFFICE VISIT (OUTPATIENT)
Dept: HEPATOLOGY | Facility: CLINIC | Age: 53
End: 2023-08-23
Payer: COMMERCIAL

## 2023-08-23 ENCOUNTER — LAB VISIT (OUTPATIENT)
Dept: LAB | Facility: HOSPITAL | Age: 53
End: 2023-08-23
Attending: INTERNAL MEDICINE
Payer: COMMERCIAL

## 2023-08-23 VITALS
WEIGHT: 152.56 LBS | HEIGHT: 67 IN | TEMPERATURE: 98 F | HEART RATE: 71 BPM | BODY MASS INDEX: 23.94 KG/M2 | SYSTOLIC BLOOD PRESSURE: 111 MMHG | DIASTOLIC BLOOD PRESSURE: 74 MMHG

## 2023-08-23 DIAGNOSIS — R50.9 FUO (FEVER OF UNKNOWN ORIGIN): ICD-10-CM

## 2023-08-23 DIAGNOSIS — F10.10 ALCOHOL ABUSE: ICD-10-CM

## 2023-08-23 DIAGNOSIS — R74.01 TRANSAMINITIS: Primary | ICD-10-CM

## 2023-08-23 DIAGNOSIS — R53.82 CHRONIC FATIGUE: ICD-10-CM

## 2023-08-23 DIAGNOSIS — R74.01 TRANSAMINITIS: ICD-10-CM

## 2023-08-23 DIAGNOSIS — K76.9 LIVER DISEASE, UNSPECIFIED: ICD-10-CM

## 2023-08-23 LAB
ALBUMIN SERPL BCP-MCNC: 4.1 G/DL (ref 3.5–5.2)
ALP SERPL-CCNC: 150 U/L (ref 55–135)
ALT SERPL W/O P-5'-P-CCNC: 74 U/L (ref 10–44)
ANION GAP SERPL CALC-SCNC: 13 MMOL/L (ref 8–16)
AST SERPL-CCNC: 88 U/L (ref 10–40)
BASOPHILS # BLD AUTO: 0.05 K/UL (ref 0–0.2)
BASOPHILS NFR BLD: 1.3 % (ref 0–1.9)
BILIRUB SERPL-MCNC: 0.8 MG/DL (ref 0.1–1)
BUN SERPL-MCNC: 13 MG/DL (ref 6–20)
CALCIUM SERPL-MCNC: 10.4 MG/DL (ref 8.7–10.5)
CHLORIDE SERPL-SCNC: 104 MMOL/L (ref 95–110)
CO2 SERPL-SCNC: 23 MMOL/L (ref 23–29)
CREAT SERPL-MCNC: 0.8 MG/DL (ref 0.5–1.4)
DIFFERENTIAL METHOD: ABNORMAL
EOSINOPHIL # BLD AUTO: 0 K/UL (ref 0–0.5)
EOSINOPHIL NFR BLD: 0.8 % (ref 0–8)
ERYTHROCYTE [DISTWIDTH] IN BLOOD BY AUTOMATED COUNT: 15.5 % (ref 11.5–14.5)
EST. GFR  (NO RACE VARIABLE): >60 ML/MIN/1.73 M^2
GLUCOSE SERPL-MCNC: 95 MG/DL (ref 70–110)
HAV IGG SER QL IA: NORMAL
HBV SURFACE AB SER-ACNC: <3 MIU/ML
HBV SURFACE AB SER-ACNC: NORMAL M[IU]/ML
HBV SURFACE AG SERPL QL IA: NORMAL
HCT VFR BLD AUTO: 45.5 % (ref 40–54)
HCV AB SERPL QL IA: REACTIVE
HGB BLD-MCNC: 15.3 G/DL (ref 14–18)
HIV 1+2 AB+HIV1 P24 AG SERPL QL IA: NORMAL
IMM GRANULOCYTES # BLD AUTO: 0.01 K/UL (ref 0–0.04)
IMM GRANULOCYTES NFR BLD AUTO: 0.3 % (ref 0–0.5)
INR PPP: 1 (ref 0.8–1.2)
IRON SERPL-MCNC: 88 UG/DL (ref 45–160)
LYMPHOCYTES # BLD AUTO: 1.7 K/UL (ref 1–4.8)
LYMPHOCYTES NFR BLD: 46.9 % (ref 18–48)
MCH RBC QN AUTO: 32.1 PG (ref 27–31)
MCHC RBC AUTO-ENTMCNC: 33.6 G/DL (ref 32–36)
MCV RBC AUTO: 96 FL (ref 82–98)
MONOCYTES # BLD AUTO: 0.4 K/UL (ref 0.3–1)
MONOCYTES NFR BLD: 11.6 % (ref 4–15)
NEUTROPHILS # BLD AUTO: 1.5 K/UL (ref 1.8–7.7)
NEUTROPHILS NFR BLD: 39.1 % (ref 38–73)
NRBC BLD-RTO: 0 /100 WBC
PLATELET # BLD AUTO: 69 K/UL (ref 150–450)
PMV BLD AUTO: 10.9 FL (ref 9.2–12.9)
POTASSIUM SERPL-SCNC: 4 MMOL/L (ref 3.5–5.1)
PROT SERPL-MCNC: 9.7 G/DL (ref 6–8.4)
PROTHROMBIN TIME: 10.3 SEC (ref 9–12.5)
RBC # BLD AUTO: 4.76 M/UL (ref 4.6–6.2)
SATURATED IRON: 19 % (ref 20–50)
SODIUM SERPL-SCNC: 140 MMOL/L (ref 136–145)
TOTAL IRON BINDING CAPACITY: 475 UG/DL (ref 250–450)
TRANSFERRIN SERPL-MCNC: 321 MG/DL (ref 200–375)
TSH SERPL DL<=0.005 MIU/L-ACNC: 1.02 UIU/ML (ref 0.4–4)
WBC # BLD AUTO: 3.71 K/UL (ref 3.9–12.7)

## 2023-08-23 PROCEDURE — 87340 HEPATITIS B SURFACE AG IA: CPT | Performed by: INTERNAL MEDICINE

## 2023-08-23 PROCEDURE — 84443 ASSAY THYROID STIM HORMONE: CPT | Performed by: INTERNAL MEDICINE

## 2023-08-23 PROCEDURE — 86790 VIRUS ANTIBODY NOS: CPT | Performed by: INTERNAL MEDICINE

## 2023-08-23 PROCEDURE — 83540 ASSAY OF IRON: CPT | Performed by: INTERNAL MEDICINE

## 2023-08-23 PROCEDURE — 82728 ASSAY OF FERRITIN: CPT | Performed by: INTERNAL MEDICINE

## 2023-08-23 PROCEDURE — 85610 PROTHROMBIN TIME: CPT | Performed by: INTERNAL MEDICINE

## 2023-08-23 PROCEDURE — 3008F PR BODY MASS INDEX (BMI) DOCUMENTED: ICD-10-PCS | Mod: CPTII,S$GLB,, | Performed by: INTERNAL MEDICINE

## 2023-08-23 PROCEDURE — 87389 HIV-1 AG W/HIV-1&-2 AB AG IA: CPT | Performed by: INTERNAL MEDICINE

## 2023-08-23 PROCEDURE — 99205 OFFICE O/P NEW HI 60 MIN: CPT | Mod: S$GLB,,, | Performed by: INTERNAL MEDICINE

## 2023-08-23 PROCEDURE — 1159F PR MEDICATION LIST DOCUMENTED IN MEDICAL RECORD: ICD-10-PCS | Mod: CPTII,S$GLB,, | Performed by: INTERNAL MEDICINE

## 2023-08-23 PROCEDURE — 99999 PR PBB SHADOW E&M-EST. PATIENT-LVL IV: ICD-10-PCS | Mod: PBBFAC,,, | Performed by: INTERNAL MEDICINE

## 2023-08-23 PROCEDURE — 84466 ASSAY OF TRANSFERRIN: CPT | Performed by: INTERNAL MEDICINE

## 2023-08-23 PROCEDURE — 36415 COLL VENOUS BLD VENIPUNCTURE: CPT | Performed by: INTERNAL MEDICINE

## 2023-08-23 PROCEDURE — 86381 MITOCHONDRIAL ANTIBODY EACH: CPT | Performed by: INTERNAL MEDICINE

## 2023-08-23 PROCEDURE — 80053 COMPREHEN METABOLIC PANEL: CPT | Performed by: INTERNAL MEDICINE

## 2023-08-23 PROCEDURE — 1160F PR REVIEW ALL MEDS BY PRESCRIBER/CLIN PHARMACIST DOCUMENTED: ICD-10-PCS | Mod: CPTII,S$GLB,, | Performed by: INTERNAL MEDICINE

## 2023-08-23 PROCEDURE — 3074F SYST BP LT 130 MM HG: CPT | Mod: CPTII,S$GLB,, | Performed by: INTERNAL MEDICINE

## 2023-08-23 PROCEDURE — 86038 ANTINUCLEAR ANTIBODIES: CPT | Performed by: INTERNAL MEDICINE

## 2023-08-23 PROCEDURE — 3074F PR MOST RECENT SYSTOLIC BLOOD PRESSURE < 130 MM HG: ICD-10-PCS | Mod: CPTII,S$GLB,, | Performed by: INTERNAL MEDICINE

## 2023-08-23 PROCEDURE — 1160F RVW MEDS BY RX/DR IN RCRD: CPT | Mod: CPTII,S$GLB,, | Performed by: INTERNAL MEDICINE

## 2023-08-23 PROCEDURE — 99999 PR PBB SHADOW E&M-EST. PATIENT-LVL IV: CPT | Mod: PBBFAC,,, | Performed by: INTERNAL MEDICINE

## 2023-08-23 PROCEDURE — 82103 ALPHA-1-ANTITRYPSIN TOTAL: CPT | Performed by: INTERNAL MEDICINE

## 2023-08-23 PROCEDURE — 86706 HEP B SURFACE ANTIBODY: CPT | Performed by: INTERNAL MEDICINE

## 2023-08-23 PROCEDURE — 86015 ACTIN ANTIBODY EACH: CPT | Performed by: INTERNAL MEDICINE

## 2023-08-23 PROCEDURE — 86803 HEPATITIS C AB TEST: CPT | Performed by: INTERNAL MEDICINE

## 2023-08-23 PROCEDURE — 3008F BODY MASS INDEX DOCD: CPT | Mod: CPTII,S$GLB,, | Performed by: INTERNAL MEDICINE

## 2023-08-23 PROCEDURE — 3078F PR MOST RECENT DIASTOLIC BLOOD PRESSURE < 80 MM HG: ICD-10-PCS | Mod: CPTII,S$GLB,, | Performed by: INTERNAL MEDICINE

## 2023-08-23 PROCEDURE — 85025 COMPLETE CBC W/AUTO DIFF WBC: CPT | Performed by: INTERNAL MEDICINE

## 2023-08-23 PROCEDURE — 99205 PR OFFICE/OUTPT VISIT, NEW, LEVL V, 60-74 MIN: ICD-10-PCS | Mod: S$GLB,,, | Performed by: INTERNAL MEDICINE

## 2023-08-23 PROCEDURE — 82390 ASSAY OF CERULOPLASMIN: CPT | Performed by: INTERNAL MEDICINE

## 2023-08-23 PROCEDURE — 4010F ACE/ARB THERAPY RXD/TAKEN: CPT | Mod: CPTII,S$GLB,, | Performed by: INTERNAL MEDICINE

## 2023-08-23 PROCEDURE — 4010F PR ACE/ARB THEARPY RXD/TAKEN: ICD-10-PCS | Mod: CPTII,S$GLB,, | Performed by: INTERNAL MEDICINE

## 2023-08-23 PROCEDURE — 3078F DIAST BP <80 MM HG: CPT | Mod: CPTII,S$GLB,, | Performed by: INTERNAL MEDICINE

## 2023-08-23 PROCEDURE — 1159F MED LIST DOCD IN RCRD: CPT | Mod: CPTII,S$GLB,, | Performed by: INTERNAL MEDICINE

## 2023-08-24 LAB
A1AT SERPL-MCNC: 235 MG/DL (ref 100–190)
ANA SER QL IF: NORMAL
CERULOPLASMIN SERPL-MCNC: 54 MG/DL (ref 15–45)
FERRITIN SERPL-MCNC: 1300 NG/ML (ref 20–300)
MITOCHONDRIA AB TITR SER IF: NORMAL {TITER}
SMOOTH MUSCLE AB TITR SER IF: NORMAL {TITER}

## 2023-08-24 NOTE — PROGRESS NOTES
"  Subjective:     Juwan Martin is here for evaluation of Hepatic Disease and Fever      HPI  Juwan Martin is here for eval of abnormal LFTs. Of note the patient has a h/o heavy EtOH use but stopped in May. Seems in may after having a nasal surgery he went "down hill". He has been losing weight , liver tests elevated, severe, chronic fatigue. He also has occasional fevers, He thinks they are related to the gras been cut but they are occurring on a regular basis. He also has low platelet. Some of these issues have are being evaluated by heme/onc.    Review of Systems    Objective:     Physical Exam  Vitals reviewed. Nursing note reviewed: flushed appearance.  Constitutional:       General: He is not in acute distress.     Appearance: He is well-developed.   HENT:      Head: Normocephalic and atraumatic.      Mouth/Throat:      Pharynx: No oropharyngeal exudate.   Eyes:      General: No scleral icterus.        Right eye: No discharge.         Left eye: No discharge.      Conjunctiva/sclera: Conjunctivae normal.      Pupils: Pupils are equal, round, and reactive to light.   Pulmonary:      Effort: Pulmonary effort is normal. No respiratory distress.      Breath sounds: Normal breath sounds. No wheezing.   Abdominal:      General: There is no distension.      Palpations: Abdomen is soft.      Tenderness: There is no abdominal tenderness.   Musculoskeletal:      Right lower leg: No edema.      Left lower leg: No edema.   Neurological:      Mental Status: He is alert and oriented to person, place, and time.   Psychiatric:         Behavior: Behavior normal.         MELD 3.0: 6 at 8/23/2023  3:46 PM  MELD-Na: 6 at 8/23/2023  3:46 PM  Calculated from:  Serum Creatinine: 0.8 mg/dL (Using min of 1 mg/dL) at 8/23/2023  3:46 PM  Serum Sodium: 140 mmol/L (Using max of 137 mmol/L) at 8/23/2023  3:46 PM  Total Bilirubin: 0.8 mg/dL (Using min of 1 mg/dL) at 8/23/2023  3:46 PM  Serum Albumin: 4.1 g/dL (Using max of 3.5 " g/dL) at 8/23/2023  3:46 PM  INR(ratio): 1.0 at 8/23/2023  3:46 PM  Age at listing (hypothetical): 52 years  Sex: Male at 8/23/2023  3:46 PM      WBC   Date Value Ref Range Status   08/23/2023 3.71 (L) 3.90 - 12.70 K/uL Final     Hemoglobin   Date Value Ref Range Status   08/23/2023 15.3 14.0 - 18.0 g/dL Final     Hematocrit   Date Value Ref Range Status   08/23/2023 45.5 40.0 - 54.0 % Final     Platelets   Date Value Ref Range Status   08/23/2023 69 (L) 150 - 450 K/uL Final     BUN   Date Value Ref Range Status   08/23/2023 13 6 - 20 mg/dL Final     Creatinine   Date Value Ref Range Status   08/23/2023 0.8 0.5 - 1.4 mg/dL Final     Glucose   Date Value Ref Range Status   08/23/2023 95 70 - 110 mg/dL Final     Calcium   Date Value Ref Range Status   08/23/2023 10.4 8.7 - 10.5 mg/dL Final     Sodium   Date Value Ref Range Status   08/23/2023 140 136 - 145 mmol/L Final     Potassium   Date Value Ref Range Status   08/23/2023 4.0 3.5 - 5.1 mmol/L Final     Chloride   Date Value Ref Range Status   08/23/2023 104 95 - 110 mmol/L Final     AST   Date Value Ref Range Status   08/23/2023 88 (H) 10 - 40 U/L Final     ALT   Date Value Ref Range Status   08/23/2023 74 (H) 10 - 44 U/L Final     Alkaline Phosphatase   Date Value Ref Range Status   08/23/2023 150 (H) 55 - 135 U/L Final     Total Bilirubin   Date Value Ref Range Status   08/23/2023 0.8 0.1 - 1.0 mg/dL Final     Comment:     For infants and newborns, interpretation of results should be based  on gestational age, weight and in agreement with clinical  observations.    Premature Infant recommended reference ranges:  Up to 24 hours.............<8.0 mg/dL  Up to 48 hours............<12.0 mg/dL  3-5 days..................<15.0 mg/dL  6-29 days.................<15.0 mg/dL       Albumin   Date Value Ref Range Status   08/23/2023 4.1 3.5 - 5.2 g/dL Final     INR   Date Value Ref Range Status   08/23/2023 1.0 0.8 - 1.2 Final     Comment:     Coumadin Therapy:  2.0 - 3.0 for  INR for all indicators except mechanical heart valves  and antiphospholipid syndromes which should use 2.5 - 3.5.           Assessment/Plan:     1. Transaminitis    2. Alcohol abuse    3. Liver disease, unspecified    4. Chronic fatigue    5. FUO (fever of unknown origin)      Juwan Martin is a 52 y.o. male withHepatic Disease and Fever    Transaminitis-patient is at risk for EtOH related liver disease and with his symptoms and thrombocytopenia could have advanced fibrosis.  -Continue with EtOH abstinence  -Proceed with abnormal LFTs eval  -Check fibroscan  -     Ambulatory referral/consult to Hepatology  -     Anti-Smooth Muscle Antibody; Future; Expected date: 08/23/2023  -     LAKISHA Screen w/Reflex; Future; Expected date: 08/23/2023  -     Iron and TIBC; Future; Expected date: 08/23/2023  -     Ferritin; Future; Expected date: 08/23/2023  -     Ceruloplasmin; Future; Expected date: 08/23/2023  -     Antimitochondrial Antibody; Future; Expected date: 08/23/2023  -     Alpha-1-Antitrypsin; Future; Expected date: 08/23/2023  -     Hepatitis A antibody, IgG; Future; Expected date: 08/23/2023  -     Hepatitis B Surface Ab, Qualitative; Future; Expected date: 08/23/2023  -     Hepatitis B Surface Antigen; Future; Expected date: 08/23/2023  -     Hepatitis C Antibody; Future; Expected date: 08/23/2023  -     Comprehensive Metabolic Panel; Standing  -     CBC Auto Differential; Standing  -     Protime-INR; Standing  -     FibroScan (Vibration Controlled Transient Elastography); Future    Alcohol abuse-already abstient  -Continue with abstinence  -     Ambulatory referral/consult to Hepatology  -     Comprehensive Metabolic Panel; Standing    Liver disease, unspecified  -     Ambulatory referral/consult to Hepatology    Chronic fatigue-likely related to medical issues  -     TSH; Future; Expected date: 08/23/2023  -     HIV 1/2 Ag/Ab (4th Gen); Future; Expected date: 08/23/2023    FUO (fever of unknown  origin)-uncertain why is having fevers  -If persistent he should have CT of his sinuses and chest    RTC in 3 months with preclinic labs will be sooner if needed          Tara Owens MD

## 2023-08-25 ENCOUNTER — PATIENT MESSAGE (OUTPATIENT)
Dept: HEPATOLOGY | Facility: CLINIC | Age: 53
End: 2023-08-25

## 2023-08-25 ENCOUNTER — PROCEDURE VISIT (OUTPATIENT)
Dept: HEPATOLOGY | Facility: CLINIC | Age: 53
End: 2023-08-25
Payer: COMMERCIAL

## 2023-08-25 VITALS — BODY MASS INDEX: 23.77 KG/M2 | WEIGHT: 151.44 LBS | HEIGHT: 67 IN

## 2023-08-25 DIAGNOSIS — R74.01 TRANSAMINITIS: ICD-10-CM

## 2023-08-25 DIAGNOSIS — R76.8 HEPATITIS C ANTIBODY TEST POSITIVE: Primary | ICD-10-CM

## 2023-08-25 PROCEDURE — 76981 USE PARENCHYMA: CPT | Mod: S$GLB,,, | Performed by: NURSE PRACTITIONER

## 2023-08-25 PROCEDURE — 76981 PR US, ELASTOGRAPHY, PARENCHYMA: ICD-10-PCS | Mod: S$GLB,,, | Performed by: NURSE PRACTITIONER

## 2023-08-25 NOTE — PROCEDURES
FibroScan (Vibration Controlled Transient Elastography)    Date/Time: 8/25/2023 1:00 PM    Performed by: Lorrie Beaver RN  Authorized by: Tara Owens MD    Probe:     Universal Protocol: Patient's identity, procedure and site were verified, confirmatory pause was performed.  Discussed procedure including risks and potential complications.  Questions answered.  Patient verbalizes understanding and wishes to proceed with VCTE.     Procedure: After providing explanations of the procedure, patient was placed in the supine position with right arm in maximum abduction to allow optimal exposure of right lateral abdomen.  Patient was briefly assessed, Testing was performed in the mid-axillary location, 50Hz Shear Wave pulses were applied and the resulting Shear Wave and Propagation Speed detected with a 3.5 MHz ultrasonic signal, using the FibroScan probe, Skin to liver capsule distance and liver parenchyma were accessed during the entire examination with the FibroScan probe, Patient was instructed to breathe normally and to abstain from sudden movements during the procedure, allowing for random measurements of liver stiffness. At least 10 Shear Waves were produced, Individual measurements of each Shear Wave were calculated.  Patient tolerated the procedure well with no complications.  Meets discharge criteria as was dismissed.  Rates pain 0 out of 10.  Patient will follow up with ordering provider to review results.

## 2023-08-25 NOTE — PROCEDURES
Fibroscan Procedure     Name: Juwan Martin  Date of Procedure : 2023   :: [place] Gutierrez  Diagnosis: other    Probe: M    Fibroscan reading: 10.6 KPa    Fibrosis:F3     CAP readin dB/m    Steatosis: :<S1       Interpretation:   No significant steatosis with advanced fibrosis

## 2023-08-29 LAB
CHROM BANDING METHOD: NORMAL
CHROMOSOME ANALYSIS BM ADDITIONAL INFORMATION: NORMAL
CHROMOSOME ANALYSIS BM RELEASED BY: NORMAL
CHROMOSOME ANALYSIS BM RESULT SUMMARY: NORMAL
CLINICAL CYTOGENETICIST REVIEW: NORMAL
KARYOTYP MAR: NORMAL
REASON FOR REFERRAL (NARRATIVE): NORMAL
REF LAB TEST METHOD: NORMAL
SPECIMEN SOURCE: NORMAL
SPECIMEN: NORMAL

## 2023-09-05 LAB
COMMENT: NORMAL
FINAL PATHOLOGIC DIAGNOSIS: NORMAL
GROSS: NORMAL
Lab: NORMAL
MICROSCOPIC EXAM: NORMAL
SUPPLEMENTAL DIAGNOSIS: NORMAL

## 2023-09-14 ENCOUNTER — OFFICE VISIT (OUTPATIENT)
Dept: HEMATOLOGY/ONCOLOGY | Facility: CLINIC | Age: 53
End: 2023-09-14
Payer: COMMERCIAL

## 2023-09-14 ENCOUNTER — LAB VISIT (OUTPATIENT)
Dept: LAB | Facility: HOSPITAL | Age: 53
End: 2023-09-14
Attending: INTERNAL MEDICINE
Payer: COMMERCIAL

## 2023-09-14 VITALS
HEART RATE: 75 BPM | OXYGEN SATURATION: 98 % | DIASTOLIC BLOOD PRESSURE: 79 MMHG | TEMPERATURE: 98 F | BODY MASS INDEX: 24.15 KG/M2 | HEIGHT: 67 IN | RESPIRATION RATE: 18 BRPM | WEIGHT: 153.88 LBS | SYSTOLIC BLOOD PRESSURE: 117 MMHG

## 2023-09-14 DIAGNOSIS — D70.9 NEUTROPENIA, UNSPECIFIED TYPE: ICD-10-CM

## 2023-09-14 DIAGNOSIS — R63.4 UNINTENTIONAL WEIGHT LOSS: ICD-10-CM

## 2023-09-14 DIAGNOSIS — R76.8 HEPATITIS C ANTIBODY TEST POSITIVE: ICD-10-CM

## 2023-09-14 DIAGNOSIS — R79.89 ELEVATED FERRITIN: ICD-10-CM

## 2023-09-14 DIAGNOSIS — F10.10 ALCOHOL ABUSE: ICD-10-CM

## 2023-09-14 DIAGNOSIS — R63.4 UNINTENTIONAL WEIGHT LOSS: Primary | ICD-10-CM

## 2023-09-14 DIAGNOSIS — Z72.0 TOBACCO USE: ICD-10-CM

## 2023-09-14 DIAGNOSIS — D69.6 THROMBOCYTOPENIA: ICD-10-CM

## 2023-09-14 DIAGNOSIS — R74.01 TRANSAMINITIS: ICD-10-CM

## 2023-09-14 PROCEDURE — 99215 PR OFFICE/OUTPT VISIT, EST, LEVL V, 40-54 MIN: ICD-10-PCS | Mod: S$GLB,,, | Performed by: INTERNAL MEDICINE

## 2023-09-14 PROCEDURE — 1159F PR MEDICATION LIST DOCUMENTED IN MEDICAL RECORD: ICD-10-PCS | Mod: CPTII,S$GLB,, | Performed by: INTERNAL MEDICINE

## 2023-09-14 PROCEDURE — 3074F SYST BP LT 130 MM HG: CPT | Mod: CPTII,S$GLB,, | Performed by: INTERNAL MEDICINE

## 2023-09-14 PROCEDURE — 86592 SYPHILIS TEST NON-TREP QUAL: CPT | Performed by: INTERNAL MEDICINE

## 2023-09-14 PROCEDURE — 99215 OFFICE O/P EST HI 40 MIN: CPT | Mod: S$GLB,,, | Performed by: INTERNAL MEDICINE

## 2023-09-14 PROCEDURE — 3008F BODY MASS INDEX DOCD: CPT | Mod: CPTII,S$GLB,, | Performed by: INTERNAL MEDICINE

## 2023-09-14 PROCEDURE — 3078F DIAST BP <80 MM HG: CPT | Mod: CPTII,S$GLB,, | Performed by: INTERNAL MEDICINE

## 2023-09-14 PROCEDURE — 87522 HEPATITIS C REVRS TRNSCRPJ: CPT | Performed by: INTERNAL MEDICINE

## 2023-09-14 PROCEDURE — 4010F ACE/ARB THERAPY RXD/TAKEN: CPT | Mod: CPTII,S$GLB,, | Performed by: INTERNAL MEDICINE

## 2023-09-14 PROCEDURE — 99999 PR PBB SHADOW E&M-EST. PATIENT-LVL III: CPT | Mod: PBBFAC,,, | Performed by: INTERNAL MEDICINE

## 2023-09-14 PROCEDURE — 3078F PR MOST RECENT DIASTOLIC BLOOD PRESSURE < 80 MM HG: ICD-10-PCS | Mod: CPTII,S$GLB,, | Performed by: INTERNAL MEDICINE

## 2023-09-14 PROCEDURE — 4010F PR ACE/ARB THEARPY RXD/TAKEN: ICD-10-PCS | Mod: CPTII,S$GLB,, | Performed by: INTERNAL MEDICINE

## 2023-09-14 PROCEDURE — 3008F PR BODY MASS INDEX (BMI) DOCUMENTED: ICD-10-PCS | Mod: CPTII,S$GLB,, | Performed by: INTERNAL MEDICINE

## 2023-09-14 PROCEDURE — 36415 COLL VENOUS BLD VENIPUNCTURE: CPT | Performed by: INTERNAL MEDICINE

## 2023-09-14 PROCEDURE — 1159F MED LIST DOCD IN RCRD: CPT | Mod: CPTII,S$GLB,, | Performed by: INTERNAL MEDICINE

## 2023-09-14 PROCEDURE — 99999 PR PBB SHADOW E&M-EST. PATIENT-LVL III: ICD-10-PCS | Mod: PBBFAC,,, | Performed by: INTERNAL MEDICINE

## 2023-09-14 PROCEDURE — 3074F PR MOST RECENT SYSTOLIC BLOOD PRESSURE < 130 MM HG: ICD-10-PCS | Mod: CPTII,S$GLB,, | Performed by: INTERNAL MEDICINE

## 2023-09-14 NOTE — PROGRESS NOTES
Subjective:      DATE OF VISIT: 9/14/23     ?  Patient ID:?Juwan Martin is a 52 y.o. male.?? MR#: [id number]   ?   REFERRING PROVIDER: No referring provider defined for this encounter.     ? Primary Care Providers:  No, Primary Doctor (General)     CHIEF COMPLAINT:  Thrombocytopenia?   ?   ONCOLOGIC DIAGNOSIS:  TBD  ?   CURRENT TREATMENT:  None    PAST TREATMENT:  None  ?   ONCOLOGIC HISTORY:   ?   Oncology History    No history exists.      Cancer Staging   No matching staging information was found for the patient.       HPI    Mr. Martin follows up after bone marrow biopsy and hepatology follow-up.       Review of Systems    ?   A comprehensive 14-point review of systems was reviewed with patient and was negative other than as specified above.   ?   PAST MEDICAL HISTORY:   Past Medical History:   Diagnosis Date    Hyperlipidemia     Hypertension     ?     PAST SURGICAL HISTORY:   No past surgical history on file.   ?   ALLERGIES:   Allergies as of 09/14/2023    (No Known Allergies)      ?   MEDICATIONS:?   No outpatient medications have been marked as taking for the 9/14/23 encounter (Office Visit) with Charlotte Baltazar MD.      ?   SOCIAL HISTORY:?   Social History     Tobacco Use    Smoking status: Every Day     Current packs/day: 0.50     Types: Cigarettes    Smokeless tobacco: Current     Types: Chew   Substance Use Topics    Alcohol use: Yes     Alcohol/week: 15.0 standard drinks of alcohol     Types: 15 Drinks containing 0.5 oz of alcohol per week     Comment: last drink was 5/2/23      ?   Chewing tobacco   Fifteen drinks per day since 21 years old   ?   FAMILY HISTORY:   family history is not on file.   ?        Objective:      Physical Exam      ?   Vitals:    09/14/23 1408   BP: 117/79   Pulse: 75   Resp: 18   Temp: 97.8 °F (36.6 °C)        ?   ECOG:?0   General appearance: Generally well appearing, in no acute distress.   Head, eyes, ears, nose, and throat: Pupils round and equally reactive to  light. Oropharynx clear with moist mucous membranes.  Facial erythema  Abdomen: Bowel sounds present, soft, nontender, nondistended.   Extremities: Warm, without edema.   Neurologic: Alert and oriented. Grossly normal strength, coordination, and gait.   Skin: No rashes, ecchymoses or petechial lesion.   ?      ?   Laboratory:  ?   No visits with results within 1 Day(s) from this visit.   Latest known visit with results is:   Lab Visit on 08/23/2023   Component Date Value Ref Range Status    Smooth Muscle Ab 08/23/2023 Negative 1:40  Negative Final    LAKISHA Screen 08/23/2023 Negative <1:80  Negative <1:80 Final    Iron 08/23/2023 88  45 - 160 ug/dL Final    Transferrin 08/23/2023 321  200 - 375 mg/dL Final    TIBC 08/23/2023 475 (H)  250 - 450 ug/dL Final    Saturated Iron 08/23/2023 19 (L)  20 - 50 % Final    Ferritin 08/23/2023 1,300 (H)  20.0 - 300.0 ng/mL Final    Ceruloplasmin 08/23/2023 54.0 (H)  15.0 - 45.0 mg/dL Final    Anti-Mitochon Ab IFA 08/23/2023 Negative 1:40  Negative Final    A-1 Antitrypsin 08/23/2023 235 (H)  100 - 190 mg/dL Final    Hepatitis A Antibody IgG 08/23/2023 Non-reactive   Final    Hep B S Ab 08/23/2023 <3.00  mIU/mL Final    Hep B S Ab 08/23/2023 Non-reactive   Final    Hepatitis B Surface Ag 08/23/2023 Non-reactive  Non-reactive Final    Hepatitis C Ab 08/23/2023 Reactive (A)  Non-reactive Final    Sodium 08/23/2023 140  136 - 145 mmol/L Final    Potassium 08/23/2023 4.0  3.5 - 5.1 mmol/L Final    Chloride 08/23/2023 104  95 - 110 mmol/L Final    CO2 08/23/2023 23  23 - 29 mmol/L Final    Glucose 08/23/2023 95  70 - 110 mg/dL Final    BUN 08/23/2023 13  6 - 20 mg/dL Final    Creatinine 08/23/2023 0.8  0.5 - 1.4 mg/dL Final    Calcium 08/23/2023 10.4  8.7 - 10.5 mg/dL Final    Total Protein 08/23/2023 9.7 (H)  6.0 - 8.4 g/dL Final    Albumin 08/23/2023 4.1  3.5 - 5.2 g/dL Final    Total Bilirubin 08/23/2023 0.8  0.1 - 1.0 mg/dL Final    Alkaline Phosphatase 08/23/2023 150 (H)  55 - 135  U/L Final    AST 08/23/2023 88 (H)  10 - 40 U/L Final    ALT 08/23/2023 74 (H)  10 - 44 U/L Final    eGFR 08/23/2023 >60  >60 mL/min/1.73 m^2 Final    Anion Gap 08/23/2023 13  8 - 16 mmol/L Final    WBC 08/23/2023 3.71 (L)  3.90 - 12.70 K/uL Final    RBC 08/23/2023 4.76  4.60 - 6.20 M/uL Final    Hemoglobin 08/23/2023 15.3  14.0 - 18.0 g/dL Final    Hematocrit 08/23/2023 45.5  40.0 - 54.0 % Final    MCV 08/23/2023 96  82 - 98 fL Final    MCH 08/23/2023 32.1 (H)  27.0 - 31.0 pg Final    MCHC 08/23/2023 33.6  32.0 - 36.0 g/dL Final    RDW 08/23/2023 15.5 (H)  11.5 - 14.5 % Final    Platelets 08/23/2023 69 (L)  150 - 450 K/uL Final    MPV 08/23/2023 10.9  9.2 - 12.9 fL Final    Immature Granulocytes 08/23/2023 0.3  0.0 - 0.5 % Final    Gran # (ANC) 08/23/2023 1.5 (L)  1.8 - 7.7 K/uL Final    Immature Grans (Abs) 08/23/2023 0.01  0.00 - 0.04 K/uL Final    Lymph # 08/23/2023 1.7  1.0 - 4.8 K/uL Final    Mono # 08/23/2023 0.4  0.3 - 1.0 K/uL Final    Eos # 08/23/2023 0.0  0.0 - 0.5 K/uL Final    Baso # 08/23/2023 0.05  0.00 - 0.20 K/uL Final    nRBC 08/23/2023 0  0 /100 WBC Final    Gran % 08/23/2023 39.1  38.0 - 73.0 % Final    Lymph % 08/23/2023 46.9  18.0 - 48.0 % Final    Mono % 08/23/2023 11.6  4.0 - 15.0 % Final    Eosinophil % 08/23/2023 0.8  0.0 - 8.0 % Final    Basophil % 08/23/2023 1.3  0.0 - 1.9 % Final    Differential Method 08/23/2023 Automated   Final    Prothrombin Time 08/23/2023 10.3  9.0 - 12.5 sec Final    INR 08/23/2023 1.0  0.8 - 1.2 Final    TSH 08/23/2023 1.023  0.400 - 4.000 uIU/mL Final    HIV 1/2 Ag/Ab 08/23/2023 Non-reactive  Non-reactive Final    Scan from outside June 2023  ?   Tumor markers   ? AFP pending  ?   Imaging:  ?  Scanned from outside ultrasound and CT June 2023  Results for orders placed or performed during the hospital encounter of 08/18/23 (from the past 2160 hour(s))   CT Chest With Contrast    Narrative    EXAMINATION:  CT CHEST WITH CONTRAST    CLINICAL HISTORY:  unintentional  weight loss; Thrombocytopenia, unspecified    TECHNIQUE:  The chest was surveyed from the apices through the posterior costophrenic angles after administration of 100 cc of Omni 350 contrast..  Data was reconstructed for multiplanar images in axial, sagittal and coronal planes in for maximal intensity projection images in the axial plane.    COMPARISON:  MRI 07/28/2023    FINDINGS:  Base of Neck: No significant abnormality.    Airways: Patent.    Lungs: Clear lungs.    Pleura: No pleural fluid.No pleural calcification.    Lary/Mediastinum: No pathologic dwight enlargement.    Esophagus: Normal.    Heart/pericardium: Normal size.  No pericardial effusion or calcification.    Pulmonary vasculature: Pulmonary arteries distribute normally.  There are four pulmonary veins.    Aorta: Left-sided aortic arch with 3 arterial branches.  The aorta maintains normal caliber, contour and course. There is no calcification of the thoracic aorta.  There is  no coronary artery calcification.    Thoracic soft tissues: Normal. Both breasts are present.    Bones: No acute fracture.  Mild endplate degenerative changes.  No suspicious lytic or sclerotic lesion.    Upper Abdomen: No abnormality of the partially imaged upper abdomen.      Impression    No significant intrathoracic abnormalities.    All CT scans at this facility use dose modulation, iterative reconstruction and/or weight based dosing when appropriate to reduce radiation dose to as low as reasonably achievable.      Electronically signed by: Juwan Trinidad MD  Date:    08/18/2023  Time:    09:42   Results for orders placed or performed during the hospital encounter of 08/18/23 (from the past 2160 hour(s))   CT Biopsy Bone Marrow (xpd)    Narrative    EXAMINATION:  CT BIOPSY BONE MARROW (XPD)    CLINICAL HISTORY:  Thrombocytopenia, unspecified    TECHNIQUE:  1% lidocaine locally    :JERICHO Trinidad    Complications: None    COMPARISON:  None    FINDINGS:  Procedure: The  risks and benefits of this procedure were discussed with the patient, written informed consent was obtained.  The patient was placed prone on the CT gantry.  Preprocedural imaging revealed normal positioning of the iliac crests.  A suitable skin site for biopsy was selected. Moderate sedation using versed and fentanyl was provided with and trained observer monitoring the patient's vital signs and level of consciousness. The total time of sedation was 30 minutes.    The skin site was prepped and draped in sterile fashion.  The skin was anesthetized with 1% lidocaine.    Using CT guidance a 11 gauge introducer needle was guided into the right iliac crest.  Prior to biopsy appropriate needle positioning was confirmed with CT. 20 cc of marrow aspirate was obtained and given to pathology.  Single 11 gauge marrow core biopsy was then obtained and given to pathology.    The needle was removed.    Postprocedural imaging was acquired. The site was bandaged sterilely. The patient left the room in stable condition.    Findings:    1.  Preprocedural CT showed appropriate imaging characteristics for right iliac bone marrow aspiration and biopsy.    2.  CT-guided biopsy of a right iliac crest with 11 gauge samples acquired.  3.  Post procedural CT showed No complication.      Impression    CT guided marrow aspiration and biopsy from the right iliac crest.    All CT scans at this facility use dose modulation, iterative reconstruction, and/or weight based dosing when appropriate to reduce radiation dose to as low as reasonable achievable.      Electronically signed by: Juwan Trinidad MD  Date:    08/18/2023  Time:    11:37     Results for orders placed or performed during the hospital encounter of 07/28/23 (from the past 2160 hour(s))   MRI Abdomen W WO Contrast    Impression    1. No focal hepatic lesion is seen on this exam.  2. Normal size liver.  No evidence for portal venous hypertension.  3. No biliary ductal  dilatation.      Electronically signed by: Rishi Tabares MD  Date:    07/28/2023  Time:    13:28     No results found for this or any previous visit (from the past 2160 hour(s)).      Pathology:    None     ?   Assessment/Plan:   Unintentional weight loss  -     RPR; Future; Expected date: 09/14/2023    Thrombocytopenia    Neutropenia, unspecified type    Alcohol abuse    Tobacco use    Transaminitis         1. Unintentional weight loss    2. Thrombocytopenia    3. Neutropenia, unspecified type    4. Alcohol abuse    5. Tobacco use    6. Transaminitis            Plan:     Problem List Items Addressed This Visit          GI    Transaminitis     Other Visit Diagnoses       Unintentional weight loss    -  Primary    Thrombocytopenia        Neutropenia, unspecified type        Alcohol abuse        Tobacco use              Thrombocytopenia:  Episode of epistaxis and easy bruisability.  Platelet count 30s on outside on repeat here 50-80s.  Peripheral smear review with isolated cytopenia no other morphologic abnormalities noted.  Transaminitis noted.  Evaluation with MRI liver was without evidence of focal liver lesion no noted cirrhosis signs of portal vein hypertension or splenomegaly.  Bone marrow biopsy On 08/18/2023 showed mildly hypercellular marrow with trilineage hematopoiesis, no abnormality seen in megakaryocytes, and mildly increased iron storage.   Hepatology workup notable for hepatitis-C antibody positive, will obtain RNA quantification today.  Unvaccinated status for hepatitis A/B.  Fibroscan completed results pending follow-up with hepatology.  Mild leukopenia/neutropenia, thrombocytopenia, transaminitis and elevated ferritin may be related to primary liver disease driving cytopenias.  Bone marrow did show mildly increased iron store and may be helpful to get HFE mutational testing and as needed MRI iron quantification to determine if iron overload may be contributing.  At current time no primary hematologic  neoplastic evidence in bone marrow but recommend close monitoring an additional sampling or evaluation may be needed.     Alcohol and tobacco use:  cessation recommended     Follow-Up:   Route Chart for Scheduling    Med Onc Chart Routing      Follow up with physician 3 months.   Follow up with [facility]    Infusion scheduling note    Injection scheduling note    Labs CBC, CMP, ferritin, iron and TIBC and other   Scheduling:  Preferred lab:  Lab interval:  +HFE mutational testing   Imaging    Pharmacy appointment    Other referrals            40 minutes of total time spent on the encounter, which includes face to face time and non-face to face time preparing to see the patient (eg, review of tests), Obtaining and/or reviewing separately obtained history, Documenting clinical information in the electronic or other health record, Independently interpreting results (not separately reported) and communicating results to the patient/family/caregiver, or Care coordination (not separately reported).

## 2023-09-15 LAB
HCV RNA SERPL QL NAA+PROBE: NOT DETECTED
HCV RNA SPEC NAA+PROBE-ACNC: NOT DETECTED IU/ML
RPR SER QL: NORMAL

## 2023-09-19 DIAGNOSIS — R63.4 UNINTENTIONAL WEIGHT LOSS: Primary | ICD-10-CM

## 2023-12-21 ENCOUNTER — OFFICE VISIT (OUTPATIENT)
Dept: HEMATOLOGY/ONCOLOGY | Facility: CLINIC | Age: 53
End: 2023-12-21
Payer: COMMERCIAL

## 2023-12-21 VITALS
WEIGHT: 163.5 LBS | HEART RATE: 64 BPM | DIASTOLIC BLOOD PRESSURE: 79 MMHG | BODY MASS INDEX: 25.66 KG/M2 | HEIGHT: 67 IN | SYSTOLIC BLOOD PRESSURE: 117 MMHG | OXYGEN SATURATION: 98 % | TEMPERATURE: 97 F

## 2023-12-21 DIAGNOSIS — D69.6 THROMBOCYTOPENIA: ICD-10-CM

## 2023-12-21 DIAGNOSIS — R74.01 TRANSAMINITIS: Primary | ICD-10-CM

## 2023-12-21 DIAGNOSIS — R79.89 ELEVATED FERRITIN: Primary | ICD-10-CM

## 2023-12-21 PROCEDURE — 99214 OFFICE O/P EST MOD 30 MIN: CPT | Mod: S$GLB,,, | Performed by: INTERNAL MEDICINE

## 2023-12-21 PROCEDURE — 3008F BODY MASS INDEX DOCD: CPT | Mod: CPTII,S$GLB,, | Performed by: INTERNAL MEDICINE

## 2023-12-21 PROCEDURE — 1159F MED LIST DOCD IN RCRD: CPT | Mod: CPTII,S$GLB,, | Performed by: INTERNAL MEDICINE

## 2023-12-21 PROCEDURE — 4010F PR ACE/ARB THEARPY RXD/TAKEN: ICD-10-PCS | Mod: CPTII,S$GLB,, | Performed by: INTERNAL MEDICINE

## 2023-12-21 PROCEDURE — 99999 PR PBB SHADOW E&M-EST. PATIENT-LVL III: CPT | Mod: PBBFAC,,, | Performed by: INTERNAL MEDICINE

## 2023-12-21 PROCEDURE — 4010F ACE/ARB THERAPY RXD/TAKEN: CPT | Mod: CPTII,S$GLB,, | Performed by: INTERNAL MEDICINE

## 2023-12-21 PROCEDURE — 3078F PR MOST RECENT DIASTOLIC BLOOD PRESSURE < 80 MM HG: ICD-10-PCS | Mod: CPTII,S$GLB,, | Performed by: INTERNAL MEDICINE

## 2023-12-21 PROCEDURE — 3008F PR BODY MASS INDEX (BMI) DOCUMENTED: ICD-10-PCS | Mod: CPTII,S$GLB,, | Performed by: INTERNAL MEDICINE

## 2023-12-21 PROCEDURE — 3074F PR MOST RECENT SYSTOLIC BLOOD PRESSURE < 130 MM HG: ICD-10-PCS | Mod: CPTII,S$GLB,, | Performed by: INTERNAL MEDICINE

## 2023-12-21 PROCEDURE — 1159F PR MEDICATION LIST DOCUMENTED IN MEDICAL RECORD: ICD-10-PCS | Mod: CPTII,S$GLB,, | Performed by: INTERNAL MEDICINE

## 2023-12-21 PROCEDURE — 99999 PR PBB SHADOW E&M-EST. PATIENT-LVL III: ICD-10-PCS | Mod: PBBFAC,,, | Performed by: INTERNAL MEDICINE

## 2023-12-21 PROCEDURE — 99214 PR OFFICE/OUTPT VISIT, EST, LEVL IV, 30-39 MIN: ICD-10-PCS | Mod: S$GLB,,, | Performed by: INTERNAL MEDICINE

## 2023-12-21 PROCEDURE — 3078F DIAST BP <80 MM HG: CPT | Mod: CPTII,S$GLB,, | Performed by: INTERNAL MEDICINE

## 2023-12-21 PROCEDURE — 3074F SYST BP LT 130 MM HG: CPT | Mod: CPTII,S$GLB,, | Performed by: INTERNAL MEDICINE

## 2023-12-21 NOTE — PROGRESS NOTES
[facility]  [address] [address]  Phone: [phone];  Fax: [phone]    Patient ID: Juwan Martin   Chief Complaint: Follow-up (Thrombocytopenia)  MRN:  [id number]     Hematologic Diagnosis:  Thrombocytopenia   Current Treatment:  Observation  Subjective   Juwan Martin is a 53 y.o. male with thrombocytopenia who presents to clinic for follow up and is accompanied by his daughter.    It is my first time meeting him.  He has had no changes in his health since his last visit in our clinic.  I reviewed his most recent test results that revealed an elevated ferritin and recommended HFE testing.  We will obtain this test and follow up the results and any potential necessary management at a virtual visit in 4 weeks.  I reviewed what hemochromatosis is and the treatment.    Review of Systems:  Review of Systems   Constitutional:  Positive for fatigue. Negative for activity change, appetite change, chills, diaphoresis, fever and unexpected weight change.   HENT:  Negative for nosebleeds.    Respiratory:  Negative for shortness of breath.    Cardiovascular:  Negative for chest pain.   Gastrointestinal:  Negative for abdominal distention, abdominal pain, anal bleeding, blood in stool, constipation, diarrhea, nausea and vomiting.   Genitourinary:  Negative for difficulty urinating and hematuria.   Musculoskeletal:  Negative for arthralgias, back pain and myalgias.   Skin:  Negative for rash.   Neurological:  Negative for dizziness, weakness, light-headedness and headaches.   Hematological:  Does not bruise/bleed easily.   Psychiatric/Behavioral:  The patient is not nervous/anxious.      History     Past Medical History:   Diagnosis Date    Hyperlipidemia     Hypertension        No past surgical history on file.    No family history on file.    Review of patient's allergies indicates:  No Known Allergies    Social History     Tobacco Use    Smoking status:  "Every Day     Current packs/day: 0.50     Types: Cigarettes    Smokeless tobacco: Current     Types: Chew   Substance Use Topics    Alcohol use: Yes     Alcohol/week: 15.0 standard drinks of alcohol     Types: 15 Drinks containing 0.5 oz of alcohol per week     Comment: last drink was 5/2/23    Drug use: Never       Physical Exam   ECOG:   ECOG SCORE    0 - Fully active-able to carry on all pre-disease performance without restriction          Vitals:  /79   Pulse 64   Temp 97.1 °F (36.2 °C) (Temporal)   Ht 5' 7" (1.702 m)   Wt 74.2 kg (163 lb 7.5 oz)   SpO2 98%   BMI 25.60 kg/m²     Physical Exam:  Physical Exam  Constitutional:       General: He is not in acute distress.     Appearance: Normal appearance. He is normal weight. He is not ill-appearing or toxic-appearing.   HENT:      Head: Normocephalic and atraumatic.   Eyes:      Extraocular Movements: Extraocular movements intact.      Conjunctiva/sclera: Conjunctivae normal.   Cardiovascular:      Rate and Rhythm: Normal rate.   Pulmonary:      Effort: Pulmonary effort is normal. No respiratory distress.   Abdominal:      General: Abdomen is flat. Bowel sounds are normal. There is no distension.      Palpations: Abdomen is soft. There is no hepatomegaly or splenomegaly.      Tenderness: There is no abdominal tenderness. There is no guarding.   Musculoskeletal:      Right lower leg: No edema.      Left lower leg: No edema.   Skin:     General: Skin is warm.      Findings: No bruising, erythema or rash.   Neurological:      General: No focal deficit present.      Mental Status: He is alert and oriented to person, place, and time. Mental status is at baseline.   Psychiatric:         Mood and Affect: Mood normal.         Behavior: Behavior normal.         Thought Content: Thought content normal.       Labs   Labs:  No visits with results within 2 Day(s) from this visit.   Latest known visit with results is:   Lab Visit on 09/14/2023   Component Date Value " Ref Range Status    RPR 09/14/2023 Non-reactive  Non-reactive Final    HCV Quantitative Result 09/14/2023 Not Detected  <12 IU/mL Final    HCV, Qualitative 09/14/2023 Not Detected  Not Detected Final    Comment: This procedure utilizes a real-time polymerase chain reaction test  from [employer]. The amplification target is a conserved region   of the HCV genome. The lower limit of quantitation is <12 IU/mL   (<1.08 Log IU/mL)and the upper limit of quantitation is 30 million   IU/mL (7.48 Log IU/mL).     Specimens reported as Detected but <12 IU/mL contain detectable  levels of Hepatitis C RNA but the viral load is below the limit of   quantitation. A Not Detected result does not rule out HCV infection,   clinical correlation is recommended.        Assessment and Plan   Thrombocytopenia   Hx of an episode of epistaxis and easy bruisability.  Platelet count 30s at outside facility at the time and on repeat here 50-80s.    Peripheral smear review with isolated cytopenia no other morphologic abnormalities noted.    MRI Abd showed no splenomegaly.    S/p BM Bx 08/18/2023: showed mildly hypercellular marrow with trilineage hematopoiesis, no abnormality seen in megakaryocytes, and mildly increased iron storage.  Cytopenias, transaminitis and elevated ferritin may be related to primary liver disease driving cytopenias.    There is no primary hematologic neoplastic evidence in bone marrow but recommend close monitoring an additional sampling or evaluation as needed       Elevated Ferritin  Perform HFE testing  Follow up with patient in 4 weeks to review testing      Chronic Medical Conditions  HTN  HLD  Transaminitis: Evaluation with MRI liver was without evidence of focal liver lesion; Hepatology workup notable for hepatitis-C antibody positive.   Unvaccinated status for hepatitis A/B. Follow-up with hepatology.           Med Onc Chart Routing      Follow up with physician 4 weeks. Virtual to review HFE testing    Follow up with [facility]    Infusion scheduling note    Injection scheduling note    Labs   Scheduling:  Preferred lab:  Lab interval:  Please link labs ordered by Dr. Stoll and Dr. Ruffin to lab visit scheduled for next week   Imaging    Pharmacy appointment    Other referrals                     The patient was seen, interviewed and examined. Pertinent lab and radiologic studies were reviewed. Pt instructed to call should they develop concerning signs/symptoms or have further questions.        Portions of the record may have been created with voice recognition software. Occasional wrong-word or sound-a-like substitutions may have occurred due to the inherent limitations of voice recognition software. Read the chart carefully and recognize, using context, where substitutions have occurred.      Lauryn Ruffin MD    Hematology/Oncology

## 2023-12-23 PROBLEM — D69.6 THROMBOCYTOPENIA: Status: ACTIVE | Noted: 2023-12-23

## 2023-12-27 ENCOUNTER — LAB VISIT (OUTPATIENT)
Dept: LAB | Facility: HOSPITAL | Age: 53
End: 2023-12-27
Attending: INTERNAL MEDICINE
Payer: COMMERCIAL

## 2023-12-27 ENCOUNTER — OFFICE VISIT (OUTPATIENT)
Dept: HEPATOLOGY | Facility: CLINIC | Age: 53
End: 2023-12-27
Payer: COMMERCIAL

## 2023-12-27 VITALS
HEIGHT: 67 IN | DIASTOLIC BLOOD PRESSURE: 77 MMHG | WEIGHT: 163.81 LBS | SYSTOLIC BLOOD PRESSURE: 116 MMHG | HEART RATE: 54 BPM | BODY MASS INDEX: 25.71 KG/M2

## 2023-12-27 DIAGNOSIS — R79.89 ELEVATED FERRITIN: ICD-10-CM

## 2023-12-27 DIAGNOSIS — K70.30 ALCOHOLIC CIRRHOSIS OF LIVER WITHOUT ASCITES: Primary | ICD-10-CM

## 2023-12-27 DIAGNOSIS — R74.01 TRANSAMINITIS: ICD-10-CM

## 2023-12-27 DIAGNOSIS — R63.4 UNINTENTIONAL WEIGHT LOSS: ICD-10-CM

## 2023-12-27 DIAGNOSIS — Z12.11 COLON CANCER SCREENING: ICD-10-CM

## 2023-12-27 DIAGNOSIS — F10.10 ALCOHOL ABUSE: ICD-10-CM

## 2023-12-27 LAB
ALBUMIN SERPL BCP-MCNC: 4.3 G/DL (ref 3.5–5.2)
ALP SERPL-CCNC: 121 U/L (ref 55–135)
ALT SERPL W/O P-5'-P-CCNC: 47 U/L (ref 10–44)
ANION GAP SERPL CALC-SCNC: 13 MMOL/L (ref 8–16)
AST SERPL-CCNC: 43 U/L (ref 10–40)
BASOPHILS # BLD AUTO: 0.04 K/UL (ref 0–0.2)
BASOPHILS NFR BLD: 0.9 % (ref 0–1.9)
BILIRUB SERPL-MCNC: 0.7 MG/DL (ref 0.1–1)
BUN SERPL-MCNC: 18 MG/DL (ref 6–20)
CALCIUM SERPL-MCNC: 10.6 MG/DL (ref 8.7–10.5)
CHLORIDE SERPL-SCNC: 106 MMOL/L (ref 95–110)
CO2 SERPL-SCNC: 25 MMOL/L (ref 23–29)
CREAT SERPL-MCNC: 0.9 MG/DL (ref 0.5–1.4)
DIFFERENTIAL METHOD: ABNORMAL
EOSINOPHIL # BLD AUTO: 0.1 K/UL (ref 0–0.5)
EOSINOPHIL NFR BLD: 2.3 % (ref 0–8)
ERYTHROCYTE [DISTWIDTH] IN BLOOD BY AUTOMATED COUNT: 14.3 % (ref 11.5–14.5)
EST. GFR  (NO RACE VARIABLE): >60 ML/MIN/1.73 M^2
FERRITIN SERPL-MCNC: 413 NG/ML (ref 20–300)
GLUCOSE SERPL-MCNC: 101 MG/DL (ref 70–110)
HCT VFR BLD AUTO: 43.8 % (ref 40–54)
HGB BLD-MCNC: 15.4 G/DL (ref 14–18)
IMM GRANULOCYTES # BLD AUTO: 0.02 K/UL (ref 0–0.04)
IMM GRANULOCYTES NFR BLD AUTO: 0.5 % (ref 0–0.5)
INR PPP: 0.9 (ref 0.8–1.2)
IRON SERPL-MCNC: 146 UG/DL (ref 45–160)
LYMPHOCYTES # BLD AUTO: 1.8 K/UL (ref 1–4.8)
LYMPHOCYTES NFR BLD: 41.6 % (ref 18–48)
MCH RBC QN AUTO: 34.1 PG (ref 27–31)
MCHC RBC AUTO-ENTMCNC: 35.2 G/DL (ref 32–36)
MCV RBC AUTO: 97 FL (ref 82–98)
MONOCYTES # BLD AUTO: 0.6 K/UL (ref 0.3–1)
MONOCYTES NFR BLD: 12.9 % (ref 4–15)
NEUTROPHILS # BLD AUTO: 1.8 K/UL (ref 1.8–7.7)
NEUTROPHILS NFR BLD: 41.8 % (ref 38–73)
NRBC BLD-RTO: 0 /100 WBC
PLATELET # BLD AUTO: 137 K/UL (ref 150–450)
PMV BLD AUTO: 9.7 FL (ref 9.2–12.9)
POTASSIUM SERPL-SCNC: 5.1 MMOL/L (ref 3.5–5.1)
PROT SERPL-MCNC: 8.5 G/DL (ref 6–8.4)
PROTHROMBIN TIME: 10.2 SEC (ref 9–12.5)
RBC # BLD AUTO: 4.52 M/UL (ref 4.6–6.2)
SATURATED IRON: 32 % (ref 20–50)
SODIUM SERPL-SCNC: 144 MMOL/L (ref 136–145)
TOTAL IRON BINDING CAPACITY: 453 UG/DL (ref 250–450)
TRANSFERRIN SERPL-MCNC: 306 MG/DL (ref 200–375)
WBC # BLD AUTO: 4.28 K/UL (ref 3.9–12.7)

## 2023-12-27 PROCEDURE — 3008F BODY MASS INDEX DOCD: CPT | Mod: CPTII,S$GLB,, | Performed by: INTERNAL MEDICINE

## 2023-12-27 PROCEDURE — 3074F PR MOST RECENT SYSTOLIC BLOOD PRESSURE < 130 MM HG: ICD-10-PCS | Mod: CPTII,S$GLB,, | Performed by: INTERNAL MEDICINE

## 2023-12-27 PROCEDURE — 85025 COMPLETE CBC W/AUTO DIFF WBC: CPT | Performed by: INTERNAL MEDICINE

## 2023-12-27 PROCEDURE — 36415 COLL VENOUS BLD VENIPUNCTURE: CPT | Performed by: INTERNAL MEDICINE

## 2023-12-27 PROCEDURE — 85610 PROTHROMBIN TIME: CPT | Performed by: INTERNAL MEDICINE

## 2023-12-27 PROCEDURE — 81256 HFE GENE: CPT | Performed by: INTERNAL MEDICINE

## 2023-12-27 PROCEDURE — 3008F PR BODY MASS INDEX (BMI) DOCUMENTED: ICD-10-PCS | Mod: CPTII,S$GLB,, | Performed by: INTERNAL MEDICINE

## 2023-12-27 PROCEDURE — 83540 ASSAY OF IRON: CPT | Performed by: INTERNAL MEDICINE

## 2023-12-27 PROCEDURE — 82728 ASSAY OF FERRITIN: CPT | Performed by: INTERNAL MEDICINE

## 2023-12-27 PROCEDURE — 99999 PR PBB SHADOW E&M-EST. PATIENT-LVL IV: ICD-10-PCS | Mod: PBBFAC,,, | Performed by: INTERNAL MEDICINE

## 2023-12-27 PROCEDURE — 99213 PR OFFICE/OUTPT VISIT, EST, LEVL III, 20-29 MIN: ICD-10-PCS | Mod: S$GLB,,, | Performed by: INTERNAL MEDICINE

## 2023-12-27 PROCEDURE — 84466 ASSAY OF TRANSFERRIN: CPT | Performed by: INTERNAL MEDICINE

## 2023-12-27 PROCEDURE — 3078F PR MOST RECENT DIASTOLIC BLOOD PRESSURE < 80 MM HG: ICD-10-PCS | Mod: CPTII,S$GLB,, | Performed by: INTERNAL MEDICINE

## 2023-12-27 PROCEDURE — 4010F ACE/ARB THERAPY RXD/TAKEN: CPT | Mod: CPTII,S$GLB,, | Performed by: INTERNAL MEDICINE

## 2023-12-27 PROCEDURE — 1159F MED LIST DOCD IN RCRD: CPT | Mod: CPTII,S$GLB,, | Performed by: INTERNAL MEDICINE

## 2023-12-27 PROCEDURE — 99999 PR PBB SHADOW E&M-EST. PATIENT-LVL IV: CPT | Mod: PBBFAC,,, | Performed by: INTERNAL MEDICINE

## 2023-12-27 PROCEDURE — 1160F RVW MEDS BY RX/DR IN RCRD: CPT | Mod: CPTII,S$GLB,, | Performed by: INTERNAL MEDICINE

## 2023-12-27 PROCEDURE — 3074F SYST BP LT 130 MM HG: CPT | Mod: CPTII,S$GLB,, | Performed by: INTERNAL MEDICINE

## 2023-12-27 PROCEDURE — 3078F DIAST BP <80 MM HG: CPT | Mod: CPTII,S$GLB,, | Performed by: INTERNAL MEDICINE

## 2023-12-27 PROCEDURE — 1159F PR MEDICATION LIST DOCUMENTED IN MEDICAL RECORD: ICD-10-PCS | Mod: CPTII,S$GLB,, | Performed by: INTERNAL MEDICINE

## 2023-12-27 PROCEDURE — 99213 OFFICE O/P EST LOW 20 MIN: CPT | Mod: S$GLB,,, | Performed by: INTERNAL MEDICINE

## 2023-12-27 PROCEDURE — 1160F PR REVIEW ALL MEDS BY PRESCRIBER/CLIN PHARMACIST DOCUMENTED: ICD-10-PCS | Mod: CPTII,S$GLB,, | Performed by: INTERNAL MEDICINE

## 2023-12-27 PROCEDURE — 80053 COMPREHEN METABOLIC PANEL: CPT | Performed by: INTERNAL MEDICINE

## 2023-12-27 PROCEDURE — 4010F PR ACE/ARB THEARPY RXD/TAKEN: ICD-10-PCS | Mod: CPTII,S$GLB,, | Performed by: INTERNAL MEDICINE

## 2023-12-27 NOTE — PROGRESS NOTES
Subjective:     Juwan Martin is here for follow up of cirrhosis    HPI  Since Juwan Martin's last visit he has been feeling better.  He reports he was treated for sinus infection.  He has no longer having issues with fevers.  Also follow up with Hematology who did not show any underlying bone marrow issue.  He does continue to have issues with fatigue.  He has cut back significantly on his drinking reports he is still drinking minimally.    No evidence of liver decompensation: no ascites, confusion or GI bleeding.      He has never had a colonoscopy.    Review of Systems    Objective:     Physical Exam  Vitals reviewed.   Constitutional:       General: He is not in acute distress.     Appearance: He is well-developed.   HENT:      Head: Normocephalic and atraumatic.      Mouth/Throat:      Pharynx: No oropharyngeal exudate.   Eyes:      General: No scleral icterus.        Right eye: No discharge.         Left eye: No discharge.      Conjunctiva/sclera: Conjunctivae normal.      Pupils: Pupils are equal, round, and reactive to light.   Pulmonary:      Effort: Pulmonary effort is normal. No respiratory distress.      Breath sounds: Normal breath sounds. No wheezing.   Abdominal:      General: There is no distension.      Palpations: Abdomen is soft.      Tenderness: There is no abdominal tenderness.   Musculoskeletal:      Right lower leg: No edema.      Left lower leg: No edema.   Neurological:      Mental Status: He is alert and oriented to person, place, and time.   Psychiatric:         Behavior: Behavior normal.         MELD 3.0: 6 at 12/27/2023  8:30 AM  MELD-Na: 6 at 12/27/2023  8:30 AM  Calculated from:  Serum Creatinine: 0.9 mg/dL (Using min of 1 mg/dL) at 12/27/2023  8:30 AM  Serum Sodium: 144 mmol/L (Using max of 137 mmol/L) at 12/27/2023  8:30 AM  Total Bilirubin: 0.7 mg/dL (Using min of 1 mg/dL) at 12/27/2023  8:30 AM  Serum Albumin: 4.3 g/dL (Using max of 3.5 g/dL) at 12/27/2023  8:30  AM  INR(ratio): 0.9 (Using min of 1) at 12/27/2023  8:30 AM  Age at listing (hypothetical): 53 years  Sex: Male at 12/27/2023  8:30 AM      WBC   Date Value Ref Range Status   12/27/2023 4.28 3.90 - 12.70 K/uL Final     Hemoglobin   Date Value Ref Range Status   12/27/2023 15.4 14.0 - 18.0 g/dL Final     Hematocrit   Date Value Ref Range Status   12/27/2023 43.8 40.0 - 54.0 % Final     Platelets   Date Value Ref Range Status   12/27/2023 137 (L) 150 - 450 K/uL Final     BUN   Date Value Ref Range Status   12/27/2023 18 6 - 20 mg/dL Final     Creatinine   Date Value Ref Range Status   12/27/2023 0.9 0.5 - 1.4 mg/dL Final     Glucose   Date Value Ref Range Status   12/27/2023 101 70 - 110 mg/dL Final     Calcium   Date Value Ref Range Status   12/27/2023 10.6 (H) 8.7 - 10.5 mg/dL Final     Sodium   Date Value Ref Range Status   12/27/2023 144 136 - 145 mmol/L Final     Potassium   Date Value Ref Range Status   12/27/2023 5.1 3.5 - 5.1 mmol/L Final     Chloride   Date Value Ref Range Status   12/27/2023 106 95 - 110 mmol/L Final     AST   Date Value Ref Range Status   12/27/2023 43 (H) 10 - 40 U/L Final     ALT   Date Value Ref Range Status   12/27/2023 47 (H) 10 - 44 U/L Final     Alkaline Phosphatase   Date Value Ref Range Status   12/27/2023 121 55 - 135 U/L Final     Total Bilirubin   Date Value Ref Range Status   12/27/2023 0.7 0.1 - 1.0 mg/dL Final     Comment:     For infants and newborns, interpretation of results should be based  on gestational age, weight and in agreement with clinical  observations.    Premature Infant recommended reference ranges:  Up to 24 hours.............<8.0 mg/dL  Up to 48 hours............<12.0 mg/dL  3-5 days..................<15.0 mg/dL  6-29 days.................<15.0 mg/dL       Albumin   Date Value Ref Range Status   12/27/2023 4.3 3.5 - 5.2 g/dL Final     INR   Date Value Ref Range Status   12/27/2023 0.9 0.8 - 1.2 Final     Comment:     Coumadin Therapy:  2.0 - 3.0 for INR for  all indicators except mechanical heart valves  and antiphospholipid syndromes which should use 2.5 - 3.5.           Assessment/Plan:     1. Alcoholic cirrhosis of liver without ascites    2. Colon cancer screening      Juwan Martin is a 53 y.o. male withCirrhosis    Alcoholic cirrhosis of liver without ascites-suspect alcohol cirrhosis other evaluation has been unremarkable.  Elevated ferritin is likely related to alcohol use.  He does have hemochromatosis gene test pending.  -continue to monitor meld score  -continue HCC surveillance  -advised complete alcohol abstinence  -needs EGD for variceal screen  -     US Abdomen Limited; Standing  -     Comprehensive Metabolic Panel; Future; Expected date: 12/27/2023  -     CBC Auto Differential; Future; Expected date: 12/27/2023  -     AFP Tumor Marker; Future; Expected date: 12/27/2023  -     Protime-INR; Future; Expected date: 12/27/2023  -     Ambulatory referral/consult to Endo Procedure ; Future; Expected date: 12/28/2023    Colon cancer screening  -     Ambulatory referral/consult to Endo Procedure ; Future; Expected date: 12/28/2023      RTC in 6 months with preclinic labs and imaging    Tara Owens MD

## 2023-12-31 LAB
GENETICIST REVIEW: NORMAL
HFE GENE MUT ANL BLD/T: NORMAL
HFE RELEASED BY: NORMAL
HFE RESULT SUMMARY: NEGATIVE
REF LAB TEST METHOD: NORMAL
SPECIMEN SOURCE: NORMAL
SPECIMEN,  HEMOCHROMATOSIS: NORMAL

## 2024-01-02 ENCOUNTER — HOSPITAL ENCOUNTER (OUTPATIENT)
Dept: PREADMISSION TESTING | Facility: HOSPITAL | Age: 54
Discharge: HOME | End: 2024-01-02
Attending: INTERNAL MEDICINE
Payer: COMMERCIAL

## 2024-01-02 ENCOUNTER — HOSPITAL ENCOUNTER (OUTPATIENT)
Dept: RADIOLOGY | Facility: HOSPITAL | Age: 54
Discharge: HOME | End: 2024-01-02
Attending: INTERNAL MEDICINE
Payer: COMMERCIAL

## 2024-01-02 DIAGNOSIS — K70.30 ALCOHOLIC CIRRHOSIS OF LIVER WITHOUT ASCITES: ICD-10-CM

## 2024-01-02 DIAGNOSIS — Z12.11 COLON CANCER SCREENING: ICD-10-CM

## 2024-01-02 DIAGNOSIS — K70.30 ALCOHOLIC CIRRHOSIS OF LIVER WITHOUT ASCITES: Primary | ICD-10-CM

## 2024-01-02 PROCEDURE — 76705 ECHO EXAM OF ABDOMEN: CPT | Mod: TC

## 2024-02-01 ENCOUNTER — ANESTHESIA EVENT (OUTPATIENT)
Dept: ENDOSCOPY | Facility: HOSPITAL | Age: 54
End: 2024-02-01
Payer: COMMERCIAL

## 2024-02-01 ENCOUNTER — ANESTHESIA (OUTPATIENT)
Dept: ENDOSCOPY | Facility: HOSPITAL | Age: 54
End: 2024-02-01
Payer: COMMERCIAL

## 2024-02-01 ENCOUNTER — HOSPITAL ENCOUNTER (OUTPATIENT)
Facility: HOSPITAL | Age: 54
Discharge: HOME | End: 2024-02-01
Attending: INTERNAL MEDICINE | Admitting: INTERNAL MEDICINE
Payer: COMMERCIAL

## 2024-02-01 DIAGNOSIS — K20.90 ESOPHAGITIS: Primary | ICD-10-CM

## 2024-02-01 DIAGNOSIS — K70.30 ALCOHOLIC CIRRHOSIS OF LIVER WITHOUT ASCITES: Primary | ICD-10-CM

## 2024-02-01 PROCEDURE — 27201012 HC FORCEPS, HOT/COLD, DISP: Performed by: INTERNAL MEDICINE

## 2024-02-01 PROCEDURE — G0121 COLON CA SCRN NOT HI RSK IND: HCPCS | Mod: ,,, | Performed by: INTERNAL MEDICINE

## 2024-02-01 PROCEDURE — 88305 TISSUE EXAM BY PATHOLOGIST: CPT | Mod: 26,,, | Performed by: PATHOLOGY

## 2024-02-01 PROCEDURE — 43239 EGD BIOPSY SINGLE/MULTIPLE: CPT | Performed by: INTERNAL MEDICINE

## 2024-02-01 PROCEDURE — 37000009 HC ANESTHESIA EA ADD 15 MINS: Performed by: INTERNAL MEDICINE

## 2024-02-01 PROCEDURE — 63600175 PHARM REV CODE 636 W HCPCS: Performed by: NURSE ANESTHETIST, CERTIFIED REGISTERED

## 2024-02-01 PROCEDURE — G0121 COLON CA SCRN NOT HI RSK IND: HCPCS | Performed by: INTERNAL MEDICINE

## 2024-02-01 PROCEDURE — 43239 EGD BIOPSY SINGLE/MULTIPLE: CPT | Mod: 51,,, | Performed by: INTERNAL MEDICINE

## 2024-02-01 PROCEDURE — 37000008 HC ANESTHESIA 1ST 15 MINUTES: Performed by: INTERNAL MEDICINE

## 2024-02-01 PROCEDURE — 25000003 PHARM REV CODE 250: Performed by: NURSE ANESTHETIST, CERTIFIED REGISTERED

## 2024-02-01 PROCEDURE — 88305 TISSUE EXAM BY PATHOLOGIST: CPT | Performed by: PATHOLOGY

## 2024-02-01 RX ORDER — LIDOCAINE HYDROCHLORIDE 10 MG/ML
INJECTION, SOLUTION EPIDURAL; INFILTRATION; INTRACAUDAL; PERINEURAL
Status: DISCONTINUED | OUTPATIENT
Start: 2024-02-01 | End: 2024-02-01

## 2024-02-01 RX ORDER — PROPOFOL 10 MG/ML
VIAL (ML) INTRAVENOUS
Status: DISCONTINUED | OUTPATIENT
Start: 2024-02-01 | End: 2024-02-01

## 2024-02-01 RX ORDER — SODIUM CHLORIDE, SODIUM LACTATE, POTASSIUM CHLORIDE, CALCIUM CHLORIDE 600; 310; 30; 20 MG/100ML; MG/100ML; MG/100ML; MG/100ML
INJECTION, SOLUTION INTRAVENOUS CONTINUOUS
Status: DISCONTINUED | OUTPATIENT
Start: 2024-02-01 | End: 2024-02-01 | Stop reason: HOSPADM

## 2024-02-01 RX ORDER — PANTOPRAZOLE SODIUM 40 MG/1
40 TABLET, DELAYED RELEASE ORAL 2 TIMES DAILY
Qty: 120 TABLET | Refills: 0 | Status: SHIPPED | OUTPATIENT
Start: 2024-02-01 | End: 2025-01-31

## 2024-02-01 RX ADMIN — PROPOFOL 50 MG: 10 INJECTION, EMULSION INTRAVENOUS at 11:02

## 2024-02-01 RX ADMIN — LIDOCAINE HYDROCHLORIDE 50 MG: 10 SOLUTION INTRAVENOUS at 10:02

## 2024-02-01 RX ADMIN — PROPOFOL 50 MG: 10 INJECTION, EMULSION INTRAVENOUS at 10:02

## 2024-02-01 RX ADMIN — SODIUM CHLORIDE, SODIUM LACTATE, POTASSIUM CHLORIDE, AND CALCIUM CHLORIDE: .6; .31; .03; .02 INJECTION, SOLUTION INTRAVENOUS at 10:02

## 2024-02-01 RX ADMIN — PROPOFOL 100 MG: 10 INJECTION, EMULSION INTRAVENOUS at 10:02

## 2024-02-01 NOTE — TRANSFER OF CARE
"Anesthesia Transfer of Care Note    Patient: Juwan Martin    Procedure(s) Performed: Procedure(s) (LRB):  EGD (ESOPHAGOGASTRODUODENOSCOPY) (N/A)  COLONOSCOPY (N/A)    Patient location: GI    Anesthesia Type: MAC    Transport from OR: Transported from OR on room air with adequate spontaneous ventilation    Post pain: adequate analgesia    Post assessment: no apparent anesthetic complications    Post vital signs: stable    Level of consciousness: sedated    Nausea/Vomiting: no nausea/vomiting    Complications: none    Transfer of care protocol was followed      Last vitals: Visit Vitals  BP (!) 161/86 (BP Location: Left arm, Patient Position: Lying)   Pulse 63   Temp 36.6 °C (97.9 °F) (Temporal)   Resp 16   Ht 5' 7" (1.702 m)   Wt 73.9 kg (163 lb)   SpO2 97%   BMI 25.53 kg/m²     "

## 2024-02-01 NOTE — ANESTHESIA POSTPROCEDURE EVALUATION
Anesthesia Post Evaluation    Patient: Juwan Martin    Procedure(s) Performed: Procedure(s) (LRB):  EGD (ESOPHAGOGASTRODUODENOSCOPY) (N/A)  COLONOSCOPY (N/A)    Final Anesthesia Type: MAC      Patient location during evaluation: GI PACU  Patient participation: Yes- Able to Participate  Level of consciousness: awake and alert  Post-procedure vital signs: reviewed and stable  Pain management: adequate  Airway patency: patent    PONV status at discharge: No PONV  Anesthetic complications: no      Cardiovascular status: blood pressure returned to baseline  Respiratory status: unassisted and spontaneous ventilation  Hydration status: euvolemic  Follow-up not needed.              Vitals Value Taken Time   /86 02/01/24 1152   Temp 36.6 °C (97.9 °F) 02/01/24 1132   Pulse 62 02/01/24 1152   Resp 18 02/01/24 1152   SpO2 99 % 02/01/24 1152         Event Time   Out of Recovery 11:55:14         Pain/Conner Score: Conner Score: 10 (2/1/2024 11:52 AM)

## 2024-02-01 NOTE — DISCHARGE SUMMARY
Problem: Activity Intolerance  Goal: # Functional status is maintained or returned to baseline  Outcome: Outcome Met, Complete Goal  Goal: # Tolerates activity for d/c setting with no clinical problems  Outcome: Outcome Met, Complete Goal      [facility]  Discharge Note  Short Stay    Procedure(s) (LRB):  EGD (ESOPHAGOGASTRODUODENOSCOPY) (N/A)  COLONOSCOPY (N/A)      OUTCOME: Patient tolerated treatment/procedure well without complication and is now ready for discharge.    DISPOSITION: Home or Self Care    FINAL DIAGNOSIS:  Small varices, normal colon    FOLLOWUP: In clinic    DISCHARGE INSTRUCTIONS:    Discharge Procedure Orders   Diet general         Clinical Reference Documents Added to Patient Instructions         Document    GASTRITIS ED (ENGLISH)            TIME SPENT ON DISCHARGE: 10 minutes

## 2024-02-01 NOTE — H&P
Short Stay Endoscopy History and Physical    PCP - Monique Jacob FNP    Procedure - EGD/Colonoscopy    No history of colon polyps, no FH of colon cancer. Needs colon cancer screening. Denies any acute issues. H/o cirrhosis needs EGD for variceal screening.    ROS:  Constitutional: No fevers, chills, No weight loss  ENT: No allergies  CV: No chest pain  Pulm: No cough, No shortness of breath  Ophtho: No vision changes  GI: see HPI  Derm: No rash  Heme: No lymphadenopathy, No bruising  MSK: No arthritis  : No dysuria, No hematuria  Endo: No hot or cold intolerance  Neuro: No syncope, No seizure  Psych: No anxiety, No depression    Medical History:  has a past medical history of Hyperlipidemia and Hypertension.    Surgical History:  has a past surgical history that includes Wrist surgery (Left).    Family History: family history is not on file.. Otherwise no colon cancer, inflammatory bowel disease, or GI malignancies.    Social History:  reports that he has quit smoking. His smoking use included cigarettes. His smokeless tobacco use includes chew. He reports that he does not currently use alcohol after a past usage of about 15.0 standard drinks of alcohol per week. He reports that he does not use drugs.    Review of patient's allergies indicates:  No Known Allergies    Medications:   Medications Prior to Admission   Medication Sig Dispense Refill Last Dose    lisinopriL 10 MG tablet Take 10 mg by mouth once daily.   1/31/2024    mv-min/folic/K1/lycopen/lutein (CENTRUM SILVER MEN ORAL) Take by mouth.   Past Month       Objective Findings:    Vital Signs:   Vitals:    02/01/24 0918   BP: (!) 161/86   Pulse: 63   Resp: 16   Temp: 97.9 °F (36.6 °C)         Physical Exam:  General Appearance: Well appearing in no acute distress  Eyes:    No scleral icterus  ENT: Neck supple, Lips, mucosa, and tongue normal; teeth and gums normal  Lungs: CTA bilaterally in anterior and posterior fields, no wheezes, no crackles.  Heart:   Regular rate, S1, S2 normal, no murmurs heard.  Abdomen: Soft, non tender, non distended with normal bowel sounds. No hepatosplenomegaly, ascites, or mass.  Extremities: No clubbing, cyanosis or edema  Skin: No rash    Labs:  Lab Results   Component Value Date    WBC 4.28 12/27/2023    HGB 15.4 12/27/2023    HCT 43.8 12/27/2023     (L) 12/27/2023    ALT 47 (H) 12/27/2023    AST 43 (H) 12/27/2023     12/27/2023    K 5.1 12/27/2023     12/27/2023    CREATININE 0.9 12/27/2023    BUN 18 12/27/2023    CO2 25 12/27/2023    TSH 1.023 08/23/2023    INR 0.9 12/27/2023       I have explained the risks and benefits of endoscopy procedures to the patient including but not limited to bleeding, perforation, infection, and death.    Proceed with EGD/colonoscopy for variceal screening and average risk colon cancer screening.

## 2024-02-01 NOTE — PROVATION PATIENT INSTRUCTIONS
Discharge Summary/Instructions after an Endoscopic Procedure  Patient Name: Juwan Martin  Patient MRN: [id number]  Patient YOB: 1970 Thursday, February 1, 2024 Tara Owens MD  Dear patient,  As a result of recent federal legislation (The Federal Cures Act), you may   receive lab or pathology results from your procedure in your [facility]   account before your physician is able to contact you. Your physician or   their representative will relay the results to you with their   recommendations at their soonest availability.  Thank you,  RESTRICTIONS:  During your procedure today, you received medications for sedation.  These   medications may affect your judgment, balance and coordination.  Therefore,   for 24 hours, you have the following restrictions:   - DO NOT drive a car, operate machinery, make legal/financial decisions,   sign important papers or drink alcohol.    ACTIVITY:  Today: no heavy lifting, straining or running due to procedural   sedation/anesthesia.  The following day: return to full activity including work.  DIET:  Eat and drink normally unless instructed otherwise.     TREATMENT FOR COMMON SIDE EFFECTS:  - Mild abdominal pain, nausea, belching, bloating or excessive gas:  rest,   eat lightly and use a heating pad.  - Sore Throat: treat with throat lozenges and/or gargle with warm salt   water.  - Because air was used during the procedure, expelling large amounts of air   from your rectum or belching is normal.  - If a bowel prep was taken, you may not have a bowel movement for 1-3 days.    This is normal.  SYMPTOMS TO WATCH FOR AND REPORT TO YOUR PHYSICIAN:  1. Abdominal pain or bloating, other than gas cramps.  2. Chest pain.  3. Back pain.  4. Signs of infection such as: chills or fever occurring within 24 hours   after the procedure.  5. Rectal bleeding, which would show as bright red, maroon, or black stools.   (A tablespoon of blood from the rectum is not serious, especially if    hemorrhoids are present.)  6. Vomiting.  7. Weakness or dizziness.  GO DIRECTLY TO THE NEAREST EMERGENCY ROOM IF YOU HAVE ANY OF THE FOLLOWING:      Difficulty breathing              Chills and/or fever over 101 F   Persistent vomiting and/or vomiting blood   Severe abdominal pain   Severe chest pain   Black, tarry stools   Bleeding- more than one tablespoon   Any other symptom or condition that you feel may need urgent attention  Your doctor recommends these additional instructions:  If any biopsies were taken, your doctors clinic will contact you in 1 to 2   weeks with any results.  - Discharge patient to home.   - Patient has a contact number available for emergencies.  The signs and   symptoms of potential delayed complications were discussed with the   patient.  Return to normal activities tomorrow.  Written discharge   instructions were provided to the patient.   - Resume previous diet.   - Continue present medications.   - Await pathology results.   - No aspirin, ibuprofen, naproxen, or other non-steroidal anti-inflammatory   drugs.   - Repeat upper endoscopy in 2 years for surveillance.   - Return to GI clinic as previously scheduled.   - Use Protonix (pantoprazole) 40 mg PO BID for 8 weeks.  For questions, problems or results please call your physician Tara Owens MD at Work:  [phone], Work:  [phone]  If you have any questions about the above instructions, call the GI   department at [phone] or call the endoscopy unit at [phone]   from 7am until 3 pm.  [facility] [place], EMERGENCY ROOM PHONE NUMBER:   [phone]  IF A COMPLICATION OR EMERGENCY SITUATION ARISES AND YOU ARE UNABLE TO REACH   YOUR PHYSICIAN - GO DIRECTLY TO THE EMERGENCY ROOM.  I have read or have had read to me these discharge instructions for my   procedure and have received a written copy.  I understand these   instructions and will follow-up with my physician if I have any questions.      __________________________________       _____________________________________  Nurse Signature                                          Patient/Designated   Responsible Party Signature  Tara Owens MD  2/1/2024 11:05:45 AM  This report has been verified and signed electronically.  Dear patient,  As a result of recent federal legislation (The Federal Cures Act), you may   receive lab or pathology results from your procedure in your [facility]   account before your physician is able to contact you. Your physician or   their representative will relay the results to you with their   recommendations at their soonest availability.  Thank you,  PROVATION

## 2024-02-01 NOTE — ANESTHESIA PREPROCEDURE EVALUATION
02/01/2024  Juwan Martin is a 53 y.o., male.      Pre-op Assessment    I have reviewed the Patient Summary Reports.    I have reviewed the NPO Status.   I have reviewed the Medications.     Review of Systems  Anesthesia Hx:  No problems with previous Anesthesia                Social:  Former Smoker       Cardiovascular:     Hypertension, well controlled           hyperlipidemia                             Hepatic/GI:      Liver Disease, (Alcoholic cirrhosis)                Physical Exam  General: Well nourished    Airway:  Mallampati: II   Mouth Opening: Normal  TM Distance: Normal  Tongue: Normal  Neck ROM: Normal ROM    Dental:  Intact    Chest/Lungs:  Normal Respiratory Rate    Heart:  Rate: Normal  Rhythm: Regular Rhythm    Anesthesia Plan  Type of Anesthesia, risks & benefits discussed:    Anesthesia Type: MAC  Intra-op Monitoring Plan: Standard ASA Monitors  Post Op Pain Control Plan: multimodal analgesia  Induction:  IV  Informed Consent: Informed consent signed with the Patient and all parties understand the risks and agree with anesthesia plan.  All questions answered.   ASA Score: 2  Day of Surgery Review of History & Physical: H&P Update referred to the surgeon/provider.    Ready For Surgery From Anesthesia Perspective.   .

## 2024-02-01 NOTE — PROVATION PATIENT INSTRUCTIONS
Discharge Summary/Instructions after an Endoscopic Procedure  Patient Name: Juwan Martin  Patient MRN: [id number]  Patient YOB: 1970 Thursday, February 1, 2024 Tara Owens MD  Dear patient,  As a result of recent federal legislation (The Federal Cures Act), you may   receive lab or pathology results from your procedure in your [facility]   account before your physician is able to contact you. Your physician or   their representative will relay the results to you with their   recommendations at their soonest availability.  Thank you,  RESTRICTIONS:  During your procedure today, you received medications for sedation.  These   medications may affect your judgment, balance and coordination.  Therefore,   for 24 hours, you have the following restrictions:   - DO NOT drive a car, operate machinery, make legal/financial decisions,   sign important papers or drink alcohol.    ACTIVITY:  Today: no heavy lifting, straining or running due to procedural   sedation/anesthesia.  The following day: return to full activity including work.  DIET:  Eat and drink normally unless instructed otherwise.     TREATMENT FOR COMMON SIDE EFFECTS:  - Mild abdominal pain, nausea, belching, bloating or excessive gas:  rest,   eat lightly and use a heating pad.  - Sore Throat: treat with throat lozenges and/or gargle with warm salt   water.  - Because air was used during the procedure, expelling large amounts of air   from your rectum or belching is normal.  - If a bowel prep was taken, you may not have a bowel movement for 1-3 days.    This is normal.  SYMPTOMS TO WATCH FOR AND REPORT TO YOUR PHYSICIAN:  1. Abdominal pain or bloating, other than gas cramps.  2. Chest pain.  3. Back pain.  4. Signs of infection such as: chills or fever occurring within 24 hours   after the procedure.  5. Rectal bleeding, which would show as bright red, maroon, or black stools.   (A tablespoon of blood from the rectum is not serious, especially if    hemorrhoids are present.)  6. Vomiting.  7. Weakness or dizziness.  GO DIRECTLY TO THE NEAREST EMERGENCY ROOM IF YOU HAVE ANY OF THE FOLLOWING:      Difficulty breathing              Chills and/or fever over 101 F   Persistent vomiting and/or vomiting blood   Severe abdominal pain   Severe chest pain   Black, tarry stools   Bleeding- more than one tablespoon   Any other symptom or condition that you feel may need urgent attention  Your doctor recommends these additional instructions:  If any biopsies were taken, your doctors clinic will contact you in 1 to 2   weeks with any results.  - Discharge patient to home.   - Patient has a contact number available for emergencies.  The signs and   symptoms of potential delayed complications were discussed with the   patient.  Return to normal activities tomorrow.  Written discharge   instructions were provided to the patient.   - Resume previous diet.   - Continue present medications.   - Repeat colonoscopy in 10 years for surveillance.   - Return to GI office PRN.  For questions, problems or results please call your physician Tara Owens MD at Work:  [phone], Work:  [phone]  If you have any questions about the above instructions, call the GI   department at [phone] or call the endoscopy unit at [phone]   from 7am until 3 pm.  [facility] [place] [facility], EMERGENCY ROOM PHONE NUMBER:   [phone]  IF A COMPLICATION OR EMERGENCY SITUATION ARISES AND YOU ARE UNABLE TO REACH   YOUR PHYSICIAN - GO DIRECTLY TO THE EMERGENCY ROOM.  I have read or have had read to me these discharge instructions for my   procedure and have received a written copy.  I understand these   instructions and will follow-up with my physician if I have any questions.     __________________________________       _____________________________________  Nurse Signature                                          Patient/Designated   Responsible Party Signature  Taar BARRERA  MD Roman  2/1/2024 11:31:50 AM  This report has been verified and signed electronically.  Dear patient,  As a result of recent federal legislation (The Federal Cures Act), you may   receive lab or pathology results from your procedure in your [facility]   account before your physician is able to contact you. Your physician or   their representative will relay the results to you with their   recommendations at their soonest availability.  Thank you,  PROVATION

## 2024-02-02 VITALS
WEIGHT: 163 LBS | HEIGHT: 67 IN | RESPIRATION RATE: 18 BRPM | OXYGEN SATURATION: 99 % | BODY MASS INDEX: 25.58 KG/M2 | TEMPERATURE: 98 F | SYSTOLIC BLOOD PRESSURE: 127 MMHG | DIASTOLIC BLOOD PRESSURE: 86 MMHG | HEART RATE: 62 BPM

## 2024-02-02 LAB
FINAL PATHOLOGIC DIAGNOSIS: NORMAL
GROSS: NORMAL
Lab: NORMAL

## 2024-07-01 ENCOUNTER — HOSPITAL ENCOUNTER (OUTPATIENT)
Dept: RADIOLOGY | Facility: HOSPITAL | Age: 54
Discharge: HOME | End: 2024-07-01
Attending: INTERNAL MEDICINE
Payer: COMMERCIAL

## 2024-07-01 DIAGNOSIS — K70.30 ALCOHOLIC CIRRHOSIS OF LIVER WITHOUT ASCITES: ICD-10-CM

## 2024-07-01 PROCEDURE — 76705 ECHO EXAM OF ABDOMEN: CPT | Mod: TC

## 2024-07-08 ENCOUNTER — OFFICE VISIT (OUTPATIENT)
Dept: HEPATOLOGY | Facility: CLINIC | Age: 54
End: 2024-07-08
Payer: COMMERCIAL

## 2024-07-08 VITALS
SYSTOLIC BLOOD PRESSURE: 157 MMHG | BODY MASS INDEX: 26.26 KG/M2 | DIASTOLIC BLOOD PRESSURE: 88 MMHG | HEIGHT: 67 IN | WEIGHT: 167.31 LBS | HEART RATE: 60 BPM

## 2024-07-08 DIAGNOSIS — I85.10 SECONDARY ESOPHAGEAL VARICES WITHOUT BLEEDING: ICD-10-CM

## 2024-07-08 DIAGNOSIS — K70.30 ALCOHOLIC CIRRHOSIS OF LIVER WITHOUT ASCITES: Primary | ICD-10-CM

## 2024-07-08 PROCEDURE — 3008F BODY MASS INDEX DOCD: CPT | Mod: CPTII,S$GLB,, | Performed by: INTERNAL MEDICINE

## 2024-07-08 PROCEDURE — 1159F MED LIST DOCD IN RCRD: CPT | Mod: CPTII,S$GLB,, | Performed by: INTERNAL MEDICINE

## 2024-07-08 PROCEDURE — 3077F SYST BP >= 140 MM HG: CPT | Mod: CPTII,S$GLB,, | Performed by: INTERNAL MEDICINE

## 2024-07-08 PROCEDURE — 4010F ACE/ARB THERAPY RXD/TAKEN: CPT | Mod: CPTII,S$GLB,, | Performed by: INTERNAL MEDICINE

## 2024-07-08 PROCEDURE — 1160F RVW MEDS BY RX/DR IN RCRD: CPT | Mod: CPTII,S$GLB,, | Performed by: INTERNAL MEDICINE

## 2024-07-08 PROCEDURE — 99214 OFFICE O/P EST MOD 30 MIN: CPT | Mod: S$GLB,,, | Performed by: INTERNAL MEDICINE

## 2024-07-08 PROCEDURE — 3079F DIAST BP 80-89 MM HG: CPT | Mod: CPTII,S$GLB,, | Performed by: INTERNAL MEDICINE

## 2024-07-08 PROCEDURE — 99999 PR PBB SHADOW E&M-EST. PATIENT-LVL III: CPT | Mod: PBBFAC,,, | Performed by: INTERNAL MEDICINE

## 2024-07-08 NOTE — PROGRESS NOTES
Subjective:     Juwan Martin is here for follow up of cirrhosis    HPI  Since Juwan Martin's last visit there have been no significant issues.  Overall feels well.  His family is concerned because he is started to drink again.  He reports he has not drinking as much as before but it seems like his intake has restarted a couple of months ago in his slightly increasing.    No evidence of liver decompensation: no ascites, confusion or GI bleeding.      Review of Systems    Objective:     Physical Exam  Vitals reviewed.   Constitutional:       General: He is not in acute distress.     Appearance: He is well-developed.   HENT:      Head: Normocephalic and atraumatic.      Mouth/Throat:      Pharynx: No oropharyngeal exudate.   Eyes:      General: No scleral icterus.        Right eye: No discharge.         Left eye: No discharge.      Conjunctiva/sclera: Conjunctivae normal.      Pupils: Pupils are equal, round, and reactive to light.   Pulmonary:      Effort: Pulmonary effort is normal. No respiratory distress.      Breath sounds: Normal breath sounds. No wheezing.   Abdominal:      General: There is no distension.      Palpations: Abdomen is soft.      Tenderness: There is no abdominal tenderness.   Musculoskeletal:      Right lower leg: No edema.      Left lower leg: No edema.   Neurological:      Mental Status: He is alert and oriented to person, place, and time.   Psychiatric:         Behavior: Behavior normal.         MELD 3.0: 6 at 7/1/2024  8:08 AM  MELD-Na: 6 at 7/1/2024  8:08 AM  Calculated from:  Serum Creatinine: 0.94 mg/dL (Using min of 1 mg/dL) at 7/1/2024  8:08 AM  Serum Sodium: 139 mmol/L (Using max of 137 mmol/L) at 7/1/2024  8:08 AM  Total Bilirubin: 0.6 mg/dL (Using min of 1 mg/dL) at 7/1/2024  8:08 AM  Serum Albumin: 4.0 g/dL (Using max of 3.5 g/dL) at 7/1/2024  8:08 AM  INR(ratio): 1.0 at 7/1/2024  8:08 AM  Age at listing (hypothetical): 53 years  Sex: Male at 7/1/2024  8:08 AM      WBC    Date Value Ref Range Status   07/01/2024 4.58 4.50 - 11.50 x10(3)/mcL Final   12/27/2023 4.28 3.90 - 12.70 K/uL Final     Hgb   Date Value Ref Range Status   07/01/2024 14.5 14.0 - 18.0 g/dL Final     Hemoglobin   Date Value Ref Range Status   12/27/2023 15.4 14.0 - 18.0 g/dL Final     Hematocrit   Date Value Ref Range Status   12/27/2023 43.8 40.0 - 54.0 % Final     Hct   Date Value Ref Range Status   07/01/2024 41.5 (L) 42.0 - 52.0 % Final     Platelets   Date Value Ref Range Status   12/27/2023 137 (L) 150 - 450 K/uL Final     Platelet   Date Value Ref Range Status   07/01/2024 146 130 - 400 x10(3)/mcL Final     BUN   Date Value Ref Range Status   12/27/2023 18 6 - 20 mg/dL Final     Blood Urea Nitrogen   Date Value Ref Range Status   07/01/2024 16.1 8.4 - 25.7 mg/dL Final     Creatinine   Date Value Ref Range Status   07/01/2024 0.94 0.73 - 1.18 mg/dL Final   12/27/2023 0.9 0.5 - 1.4 mg/dL Final     Glucose   Date Value Ref Range Status   12/27/2023 101 70 - 110 mg/dL Final     Calcium   Date Value Ref Range Status   07/01/2024 9.1 8.4 - 10.2 mg/dL Final   12/27/2023 10.6 (H) 8.7 - 10.5 mg/dL Final     Sodium   Date Value Ref Range Status   07/01/2024 139 136 - 145 mmol/L Final   12/27/2023 144 136 - 145 mmol/L Final     Potassium   Date Value Ref Range Status   07/01/2024 4.4 3.5 - 5.1 mmol/L Final   12/27/2023 5.1 3.5 - 5.1 mmol/L Final     Chloride   Date Value Ref Range Status   07/01/2024 106 98 - 107 mmol/L Final   12/27/2023 106 95 - 110 mmol/L Final     AST   Date Value Ref Range Status   07/01/2024 45 (H) 5 - 34 unit/L Final   12/27/2023 43 (H) 10 - 40 U/L Final     ALT   Date Value Ref Range Status   07/01/2024 53 0 - 55 unit/L Final   12/27/2023 47 (H) 10 - 44 U/L Final     Alkaline Phosphatase   Date Value Ref Range Status   12/27/2023 121 55 - 135 U/L Final     ALP   Date Value Ref Range Status   07/01/2024 100 40 - 150 unit/L Final     Total Bilirubin   Date Value Ref Range Status   12/27/2023  0.7 0.1 - 1.0 mg/dL Final     Comment:     For infants and newborns, interpretation of results should be based  on gestational age, weight and in agreement with clinical  observations.    Premature Infant recommended reference ranges:  Up to 24 hours.............<8.0 mg/dL  Up to 48 hours............<12.0 mg/dL  3-5 days..................<15.0 mg/dL  6-29 days.................<15.0 mg/dL       Bilirubin Total   Date Value Ref Range Status   07/01/2024 0.6 <=1.5 mg/dL Final     Albumin   Date Value Ref Range Status   07/01/2024 4.0 3.5 - 5.0 g/dL Final   12/27/2023 4.3 3.5 - 5.2 g/dL Final     INR   Date Value Ref Range Status   07/01/2024 1.0 <=1.3 Final   12/27/2023 0.9 0.8 - 1.2 Final     Comment:     Coumadin Therapy:  2.0 - 3.0 for INR for all indicators except mechanical heart valves  and antiphospholipid syndromes which should use 2.5 - 3.5.           Assessment/Plan:     1. Alcoholic cirrhosis of liver without ascites    2. Secondary esophageal varices without bleeding      Juwan Martin is a 53 y.o. male withCirrhosis    Alcoholic cirrhosis of liver without ascites-patient was compensated with low MELD score suspect liver is recovering but significantly concerned about his ongoing alcohol use  -extensive discussion with patient, daughter and wife about the concerns around his alcohol intake and risk of worsening his liver function.  Also explained that if his liver gets worse in the setting of continued alcohol use liver transplantation will not be an option.  They expressed understanding.  Seems like the daughter and wife have been trying to get him to abstain from alcohol.  -continue to monitor meld score  -continue with HCC surveillance  -     US Abdomen Limited; Future; Expected date: 07/08/2024  -     Comprehensive Metabolic Panel; Future; Expected date: 07/08/2024  -     CBC Auto Differential; Future; Expected date: 07/08/2024  -     AFP Tumor Marker; Future; Expected date: 07/08/2024  -      Protime-INR; Future; Expected date: 07/08/2024    Secondary esophageal varices without bleeding  -repeat upper endoscopy in February 2025 given ongoing alcohol use    RTC in 6 months with preclinic labs and imaging    Tara Owens MD

## 2024-07-24 ENCOUNTER — PATIENT MESSAGE (OUTPATIENT)
Dept: HEPATOLOGY | Facility: CLINIC | Age: 54
End: 2024-07-24
Payer: COMMERCIAL

## 2024-10-16 ENCOUNTER — TELEPHONE (OUTPATIENT)
Dept: HEPATOLOGY | Facility: CLINIC | Age: 54
End: 2024-10-16
Payer: COMMERCIAL

## 2024-10-16 DIAGNOSIS — E87.5 SERUM POTASSIUM ELEVATED: Primary | ICD-10-CM

## 2024-10-16 NOTE — TELEPHONE ENCOUNTER
----- Message from Bre sent at 10/16/2024  3:01 PM CDT -----  Contact: Wife, Ellen, [phone]  Calling to speak with the nurse regarding the patient's potassium levels are high. Please call him. Thanks.

## 2024-10-16 NOTE — TELEPHONE ENCOUNTER
Patient's wife phoned stated that he had labs drawn by home health and was told that his potassium was too high and to contact us. Upon receiving his report his level was 5.5. She would like to know your thoughts on this, she's extremely anxious. Please advise.

## 2024-10-17 NOTE — TELEPHONE ENCOUNTER
Need to get a copy of labs to review, recommend stopping anything like vitamins with potassium in it. Patient is not on any meds that would be contributing. Repeat potassium this week.

## 2024-10-18 ENCOUNTER — TELEPHONE (OUTPATIENT)
Dept: HEPATOLOGY | Facility: CLINIC | Age: 54
End: 2024-10-18
Payer: COMMERCIAL

## 2024-10-21 ENCOUNTER — LAB VISIT (OUTPATIENT)
Dept: LAB | Facility: HOSPITAL | Age: 54
End: 2024-10-21
Attending: INTERNAL MEDICINE
Payer: COMMERCIAL

## 2024-10-21 DIAGNOSIS — E87.5 SERUM POTASSIUM ELEVATED: ICD-10-CM

## 2024-10-21 LAB — POTASSIUM SERPL-SCNC: 4 MMOL/L (ref 3.5–5.1)

## 2024-10-21 PROCEDURE — 84132 ASSAY OF SERUM POTASSIUM: CPT

## 2024-10-21 PROCEDURE — 36415 COLL VENOUS BLD VENIPUNCTURE: CPT

## 2024-12-11 ENCOUNTER — TELEPHONE (OUTPATIENT)
Dept: HEPATOLOGY | Facility: CLINIC | Age: 54
End: 2024-12-11
Payer: COMMERCIAL

## 2024-12-11 NOTE — TELEPHONE ENCOUNTER
----- Message from Stephanie sent at 12/11/2024  2:21 PM CST -----  Regarding: pt advise  Contact: pt  Type: Appointment Request    Caller is requesting an appointment     Name of Caller: pt's Wife   Reason for appointment: 6 Month Follow Up  Would the patient rather a call back or a response via [facility]? Call back  Best Call Back Number:   [phone]  Additional Information:Patient would like to schedule w/ Dr Owens,..  Please call, Thank You

## 2024-12-11 NOTE — TELEPHONE ENCOUNTER
Returned wife's call and informed her of 1/8/25 labs and imaging at [facility] and explained to her that once those results are reviewed by the provider a follow up will be determined at that time to which she agreed and voiced understanding.

## 2025-01-08 ENCOUNTER — HOSPITAL ENCOUNTER (OUTPATIENT)
Dept: RADIOLOGY | Facility: HOSPITAL | Age: 55
Discharge: HOME | End: 2025-01-08
Attending: INTERNAL MEDICINE
Payer: COMMERCIAL

## 2025-01-08 DIAGNOSIS — K70.30 ALCOHOLIC CIRRHOSIS OF LIVER WITHOUT ASCITES: ICD-10-CM

## 2025-01-08 PROCEDURE — 76705 ECHO EXAM OF ABDOMEN: CPT | Mod: TC

## 2025-02-15 ENCOUNTER — RESULTS FOLLOW-UP (OUTPATIENT)
Dept: HEPATOLOGY | Facility: HOSPITAL | Age: 55
End: 2025-02-15

## 2025-07-15 ENCOUNTER — TELEPHONE (OUTPATIENT)
Dept: HEPATOLOGY | Facility: CLINIC | Age: 55
End: 2025-07-15
Payer: COMMERCIAL

## 2025-07-15 NOTE — TELEPHONE ENCOUNTER
Called the patient and spoke with his wife concerning his appointment.  Informed that we can do labs after his appointment and ultrasound can be scheduled for a different date.  Wife agreed with all.

## 2025-07-20 PROBLEM — F10.90 ALCOHOL USE DISORDER: Status: ACTIVE | Noted: 2025-07-20

## 2025-07-20 PROBLEM — K76.6 PORTAL HYPERTENSION: Status: ACTIVE | Noted: 2025-07-20

## 2025-07-20 PROBLEM — I85.10 SECONDARY ESOPHAGEAL VARICES WITHOUT BLEEDING: Status: ACTIVE | Noted: 2025-07-20

## 2025-07-20 NOTE — PROGRESS NOTES
Hepatology Note    PATIENT: Juwan Martin  MRN: [id number]  DATE: 7/22/2025    Provider: Hepatologist - Dr Lakhani  Urgency of review: non-urgent  Referring provider: No ref. provider found    Diagnosis:   1. Alcoholic cirrhosis of liver without ascites    2. Secondary esophageal varices without bleeding    3. Alcohol use disorder    4. Portal hypertension        Chief complaint:   Chief Complaint   Patient presents with    Cirrhosis     Follow up     Hepatic Disease       Subjective:    Initial History: Juwan Martin is a 54 y.o. male who was referred to Hepatology Clinic for consultation of EtOH related cirrhosis       Diagnosed with cirrhosis: diagnosed 2023 decompensated no     Cirrhosis HCM:  HCC screening: Imaging 1/25 No Liver lesion,  Variceal screening: EGD 2/2024-grade 1 varices  Dexa scan:no  Hepatitis A/B Immune Status: Vaccinated      07/22/2025   Patient is new to me and previously seen by Dr Owens  He denies recent hematemesis, coffee ground emesis, melena, hematochezia, jaundice, confusion, LE edema, abdominal distension.  His family is concerned because he is started to drink again. He reports he has not drinking as much as before but it seems like his intake has restarted   Last drink > 1 year    Prior Relevant History:    He  denies hepatotoxic medicatio    Review of systems:  A review of 12+ systems was conducted with pertinent positive and negative findings documented in HPI with all other systems reviewed and negative.      PFSH:  Past medical, family, and social history reviewed as documented in chart with pertinent positive medical, family, and social history detailed in HPI.    Past Medical History:   Past Medical History:   Diagnosis Date    Hyperlipidemia     Hypertension        Past Surgical HIstory:   Past Surgical History:   Procedure Laterality Date    COLONOSCOPY N/A 2/1/2024    Procedure: COLONOSCOPY;  Surgeon: Tara Owens MD;  Location: [facility];  Service: Endoscopy;   Laterality: N/A;    ESOPHAGOGASTRODUODENOSCOPY N/A 2/1/2024    Procedure: EGD (ESOPHAGOGASTRODUODENOSCOPY);  Surgeon: Tara Owens MD;  Location: [facility];  Service: Endoscopy;  Laterality: N/A;    WRIST SURGERY Left        Family History: No family history on file.  He has no known family history of liver disease.     Social History:  reports that he has quit smoking. His smoking use included cigarettes. His smokeless tobacco use includes chew. He reports that he does not currently use alcohol after a past usage of about 15.0 standard drinks of alcohol per week. He reports that he does not use drugs.    He has significant history of Alcohol     He denies history of IV drug use/Tatto  He  denies high-risk sexual contacts, no raw seafood, no sick contacts      Allergies:  Review of patient's allergies indicates:  No Known Allergies    Medications:  Current Medications[1]    Review of Systems   Constitutional:  Positive for fatigue. Negative for fever and unexpected weight change.   HENT:  Negative for ear pain, nosebleeds and trouble swallowing.    Eyes:  Negative for discharge and redness.   Respiratory:  Negative for cough and shortness of breath.    Cardiovascular:  Negative for palpitations and leg swelling.   Gastrointestinal:  Negative for abdominal distention, abdominal pain, diarrhea and vomiting.   Endocrine: Negative for cold intolerance and polyuria.   Genitourinary:  Negative for flank pain and hematuria.   Musculoskeletal:  Negative for back pain.   Skin:  Negative for pallor.   Neurological:  Negative for seizures and headaches.   Hematological:  Does not bruise/bleed easily.   Psychiatric/Behavioral:  Negative for confusion and hallucinations.        MELD 3.0: 6 at 1/8/2025  9:15 AM  MELD-Na: 6 at 1/8/2025  9:15 AM  Calculated from:  Serum Creatinine: 0.94 mg/dL (Using min of 1 mg/dL) at 1/8/2025  9:15 AM  Serum Sodium: 140 mmol/L (Using max of 137 mmol/L) at 1/8/2025  9:15 AM  Total Bilirubin: 0.5  "mg/dL (Using min of 1 mg/dL) at 1/8/2025  9:15 AM  Serum Albumin: 4.1 g/dL (Using max of 3.5 g/dL) at 1/8/2025  9:15 AM  INR(ratio): 0.9 (Using min of 1) at 1/8/2025  9:15 AM  Age at listing (hypothetical): 54 years  Sex: Male at 1/8/2025  9:15 AM       Objective:      Vitals:   Vitals:    07/22/25 0909   BP: (!) 147/86   Patient Position: Sitting   Pulse: (!) 55   SpO2: 97%   Weight: 78.3 kg (172 lb 9.9 oz)   Height: 5' 7" (1.702 m)       Physical Exam  Constitutional:       Appearance: Normal appearance.   HENT:      Head: Normocephalic and atraumatic.      Right Ear: Tympanic membrane and external ear normal.      Left Ear: Tympanic membrane and external ear normal.      Mouth/Throat:      Mouth: Mucous membranes are moist.   Eyes:      Extraocular Movements: Extraocular movements intact.      Pupils: Pupils are equal, round, and reactive to light.   Cardiovascular:      Rate and Rhythm: Normal rate and regular rhythm.      Pulses: Normal pulses.      Heart sounds: Normal heart sounds.   Pulmonary:      Effort: Pulmonary effort is normal.      Breath sounds: Normal breath sounds.   Abdominal:      General: Bowel sounds are normal. There is no distension.      Palpations: Abdomen is soft. There is no mass.      Tenderness: There is no abdominal tenderness.   Musculoskeletal:         General: No swelling or deformity. Normal range of motion.      Cervical back: Normal range of motion and neck supple.   Skin:     Coloration: Skin is not jaundiced.   Neurological:      General: No focal deficit present.      Mental Status: He is alert and oriented to person, place, and time.      Cranial Nerves: No cranial nerve deficit.   Psychiatric:         Mood and Affect: Mood normal.         Behavior: Behavior normal.         Laboratory Data:  No visits with results within 1 Week(s) from this visit.   Latest known visit with results is:   Lab Visit on 01/08/2025   Component Date Value Ref Range Status    Sodium 01/08/2025 140  " 136 - 145 mmol/L Final    Potassium 01/08/2025 4.8  3.5 - 5.1 mmol/L Final    Chloride 01/08/2025 106  98 - 107 mmol/L Final    CO2 01/08/2025 29  22 - 29 mmol/L Final    Glucose 01/08/2025 94  74 - 100 mg/dL Final    Blood Urea Nitrogen 01/08/2025 17.2  8.4 - 25.7 mg/dL Final    Creatinine 01/08/2025 0.94  0.72 - 1.25 mg/dL Final    Calcium 01/08/2025 9.4  8.4 - 10.2 mg/dL Final    Protein Total 01/08/2025 7.4  6.4 - 8.3 gm/dL Final    Albumin 01/08/2025 4.1  3.5 - 5.0 g/dL Final    Globulin 01/08/2025 3.3  2.4 - 3.5 gm/dL Final    Albumin/Globulin Ratio 01/08/2025 1.2  1.1 - 2.0 ratio Final    Bilirubin Total 01/08/2025 0.5  <=1.5 mg/dL Final    ALP 01/08/2025 84  40 - 150 unit/L Final    ALT 01/08/2025 44  0 - 55 unit/L Final    AST 01/08/2025 37 (H)  5 - 34 unit/L Final    eGFR 01/08/2025 >60  mL/min/1.73/m2 Final    Anion Gap 01/08/2025 5.0  mEq/L Final    BUN/Creatinine Ratio 01/08/2025 18   Final    Alpha Fetoprotein Level 01/08/2025 3.00  <=8.90 ng/mL Final    PT 01/08/2025 12.2 (L)  12.5 - 14.5 seconds Final    INR 01/08/2025 0.9  <=1.3 Final    WBC 01/08/2025 4.94  4.50 - 11.50 x10(3)/mcL Final    RBC 01/08/2025 4.40 (L)  4.70 - 6.10 x10(6)/mcL Final    Hgb 01/08/2025 14.9  14.0 - 18.0 g/dL Final    Hct 01/08/2025 42.7  42.0 - 52.0 % Final    MCV 01/08/2025 97.0 (H)  80.0 - 94.0 fL Final    MCH 01/08/2025 33.9 (H)  27.0 - 31.0 pg Final    MCHC 01/08/2025 34.9  33.0 - 36.0 g/dL Final    RDW 01/08/2025 12.5  11.5 - 17.0 % Final    Platelet 01/08/2025 164  130 - 400 x10(3)/mcL Final    MPV 01/08/2025 10.0  7.4 - 10.4 fL Final    Neut % 01/08/2025 53.9  % Final    Lymph % 01/08/2025 34.6  % Final    Mono % 01/08/2025 8.5  % Final    Eos % 01/08/2025 1.4  % Final    Basophil % 01/08/2025 1.2  % Final    Imm Grans % 01/08/2025 0.4  % Final    Neut # 01/08/2025 2.66  2.1 - 9.2 x10(3)/mcL Final    Lymph # 01/08/2025 1.71  0.6 - 4.6 x10(3)/mcL Final    Mono # 01/08/2025 0.42  0.1 - 1.3 x10(3)/mcL Final    Eos #  "01/08/2025 0.07  0 - 0.9 x10(3)/mcL Final    Baso # 01/08/2025 0.06  <=0.2 x10(3)/mcL Final    Imm Gran # 01/08/2025 0.02  0.00 - 0.04 x10(3)/mcL Final    NRBC% 01/08/2025 0.0  % Final       Lab Results   Component Value Date    INR 0.9 01/08/2025    INR 1.0 07/01/2024    INR 0.9 12/27/2023    PROTIME 12.2 (L) 01/08/2025    PROTIME 12.9 07/01/2024       Lab Results   Component Value Date    SMOOTHMUSCAB Negative 1:40 08/23/2023     Lab Results   Component Value Date    IRON 146 12/27/2023    TIBC 453 (H) 12/27/2023    FERRITIN 413 (H) 12/27/2023     Lab Results   Component Value Date    HEPCAB Reactive (A) 08/23/2023     Lab Results   Component Value Date    TSH 1.023 08/23/2023     No results found for: "LAKISHA"    No results found for: "ABORH"        No results found for: "LABA1C", "HGBA1C"  No results found for: "CHOL"  No results found for: "HDL"  No results found for: "LDLCALC"  No results found for: "TRIG"  No results found for: "CHOLHDL"      I personally reviewed imaging studies and outside records..      Assessment:       1. Alcoholic cirrhosis of liver without ascites    2. Secondary esophageal varices without bleeding    3. Alcohol use disorder    4. Portal hypertension                 Plan:     Problem List Items Addressed This Visit          Psychiatric    Alcohol use disorder       GI    Alcoholic cirrhosis of liver without ascites - Primary    Portal hypertension    Secondary esophageal varices without bleeding       Juwan was seen today for cirrhosis and hepatic disease.    Diagnoses and all orders for this visit:    Alcoholic cirrhosis of liver without ascites    Secondary esophageal varices without bleeding    Alcohol use disorder    Portal hypertension        Cirrhosis  EtOH related  Well compensated clinically  Complete the etiological work up for cirrhosis  Monitor the MELD score  Discussed the Transplant evaluation process in detail. At present plan to monitor the MELD and decompensating " event  HCC surveillance--Imaging and AFP every 6 month  Dexa scan locally with PCP  Check Hepatitis A/B immune status and Immunization with PCP  if not immune    Varices----EGD now      Alcohol Use disorder  congratulation      Return to clinic in 6 months.    I have sent communication to the referring physician and/or primary care provider.      Time Statement  A total time spent includes time preparing to see patient, reviewing  diagnostic studies and records, direct face-to-face visit, completing orders, medications , reconciliation, prescription management, and care coordination    We discussed in depth the nature of the patient's disease, the management plan in details. I have provided the patient with an opportunity to ask questions and have all questions answered to his satisfaction.     Discussed with patient that it is likely that she will see results before Myself or my nurse are able to view them and report results due to the Cures Act passed 4/1/21. Results will be sent immediately to the patient who are enrolled in the patient portal. If results come through after business hours or on weekend, we will not see them until the next business day that we are in the office. If resulted during the business day, we will likely not be able to review them until after completing all patient visits in office that day.       John Lakhani MD  Transplant Hepatologist and Gastroenterologist  [facility] [place]         [1]   Current Outpatient Medications   Medication Sig Dispense Refill    mv-min/folic/K1/lycopen/lutein (CENTRUM SILVER MEN ORAL) Take by mouth. (Patient not taking: Reported on 7/22/2025)       No current facility-administered medications for this visit.

## 2025-07-22 ENCOUNTER — OFFICE VISIT (OUTPATIENT)
Dept: HEPATOLOGY | Facility: CLINIC | Age: 55
End: 2025-07-22
Payer: COMMERCIAL

## 2025-07-22 ENCOUNTER — HOSPITAL ENCOUNTER (OUTPATIENT)
Dept: RADIOLOGY | Facility: HOSPITAL | Age: 55
Discharge: HOME | End: 2025-07-22
Attending: INTERNAL MEDICINE
Payer: COMMERCIAL

## 2025-07-22 VITALS
DIASTOLIC BLOOD PRESSURE: 86 MMHG | SYSTOLIC BLOOD PRESSURE: 147 MMHG | HEIGHT: 67 IN | WEIGHT: 172.63 LBS | HEART RATE: 55 BPM | OXYGEN SATURATION: 97 % | BODY MASS INDEX: 27.09 KG/M2

## 2025-07-22 DIAGNOSIS — K76.6 PORTAL HYPERTENSION: ICD-10-CM

## 2025-07-22 DIAGNOSIS — F10.90 ALCOHOL USE DISORDER: ICD-10-CM

## 2025-07-22 DIAGNOSIS — K70.30 ALCOHOLIC CIRRHOSIS OF LIVER WITHOUT ASCITES: Primary | ICD-10-CM

## 2025-07-22 DIAGNOSIS — K70.30 ALCOHOLIC CIRRHOSIS OF LIVER WITHOUT ASCITES: ICD-10-CM

## 2025-07-22 DIAGNOSIS — I85.10 SECONDARY ESOPHAGEAL VARICES WITHOUT BLEEDING: ICD-10-CM

## 2025-07-22 PROCEDURE — 76705 ECHO EXAM OF ABDOMEN: CPT | Mod: TC

## 2025-07-22 PROCEDURE — 3008F BODY MASS INDEX DOCD: CPT | Mod: CPTII,S$GLB,, | Performed by: INTERNAL MEDICINE

## 2025-07-22 PROCEDURE — 76705 ECHO EXAM OF ABDOMEN: CPT | Mod: 26,,, | Performed by: STUDENT IN AN ORGANIZED HEALTH CARE EDUCATION/TRAINING PROGRAM

## 2025-07-22 PROCEDURE — 1159F MED LIST DOCD IN RCRD: CPT | Mod: CPTII,S$GLB,, | Performed by: INTERNAL MEDICINE

## 2025-07-22 PROCEDURE — 99204 OFFICE O/P NEW MOD 45 MIN: CPT | Mod: S$GLB,,, | Performed by: INTERNAL MEDICINE

## 2025-07-22 PROCEDURE — 99999 PR PBB SHADOW E&M-EST. PATIENT-LVL III: CPT | Mod: PBBFAC,,, | Performed by: INTERNAL MEDICINE

## 2025-07-22 PROCEDURE — 3077F SYST BP >= 140 MM HG: CPT | Mod: CPTII,S$GLB,, | Performed by: INTERNAL MEDICINE

## 2025-07-22 PROCEDURE — 3079F DIAST BP 80-89 MM HG: CPT | Mod: CPTII,S$GLB,, | Performed by: INTERNAL MEDICINE

## 2025-07-22 NOTE — PATIENT INSTRUCTIONS
Because you have cirrhosis, it is important to attend regular clinic visits  with an Ultrasound and blood tests every 6 months to screen for liver cancer (you are at risk of developing liver cancer due to scar tissue in the liver)     Signs and symptoms of worsening liver disease include jaundice, fluid in the belly (ascites), and confusion/disorientation/slowed thought processes due to hepatic encephalopathy (toxins building up because of liver problems).   You should seek medical attention if any of these things occur.    Also, possible bleeding from esophageal varices (blood vessels in the stomach and foodpipe can burst and cause fatal bleeding).  Therefore, if you have symptoms of vomiting blood, blood in your stool, dark or black stools or vomiting coffee ground vomit, YOU SHOULD GO TO THE EMERGENCY ROOM IMMEDIATELY.      Cirrhosis can increase the risk of liver cancer, liver failure, and death. However, we will watch your liver function score (MELD score) closely with each clinic visit. A normal MELD score is 6, highest is 40. We will check this with every clinic visit. A MELD 15 or higher is when we start to consider transplant because MELD 15 or higher indicates that the liver is not functioning as well      [email or web address]  Please visit the website for for information on Liver disease      Cirrhosis Counseling  - strict abstinence of alcohol use  - avoid non-steroidal anti-inflammatory drugs (NSAIDs) such as ibuprofen, Motrin, naprosyn, Alleve due to the risk of kidney damage  - can take acetaminophen (Tylenol), no more than 2000 mg per day  - low sodium (salt) 2 gram per day diet  - nutrition: 25-30kcal (calorie per body body weight in kilogram) per day  - no need to restrict protein in diet  - high protein diet: 1.2-1.5 gram/kg (protein per body weight in kilogram) per day to prevent muscle mass loss  - avoid fasting  - Late night snack with 50 gram of complex carbohydrates and  protein  - resistance exercises for muscle strength  - avoid raw seafoods due to the risk of fatal Vibrio vulnificus infection  - compliance with lactulose and/or  rifaximin if you have developed hepatic encephalopathy   -I recommend you do NOT drive a car or operate machinery currently considering risk of Hepatic encephalopathy  - ultrasound or imaging of the liver every 6 months for liver cancer screening (you are at risk of developing liver cancer due to scar tissue in the liver)  - Upper endoscopy surveillance to screen for varices in the stomach and foodpipe which can burst and cause fatal bleeding      Thanks for trusting us with your healthcare needs and using [facility]. If you want to ask us a question, you can do so by replying to this message or by calling [phone].

## 2025-07-23 DIAGNOSIS — K76.9 LIVER DISEASE, UNSPECIFIED: Primary | ICD-10-CM

## 2025-07-24 ENCOUNTER — TELEPHONE (OUTPATIENT)
Dept: HEPATOLOGY | Facility: CLINIC | Age: 55
End: 2025-07-24
Payer: COMMERCIAL

## 2025-07-24 NOTE — TELEPHONE ENCOUNTER
----- Message from John Lakhani MD sent at 7/23/2025 10:43 AM CDT -----  Imaging does not suggest liver cancer.   Plan MRI/CT as per suggested to evaluate further  ----- Message -----  From: [facility] Rad Results In  Sent: 7/22/2025   3:54 PM CDT  To: John Lakhani MD

## 2025-07-24 NOTE — TELEPHONE ENCOUNTER
Scheduled MRI per Dr. Lakhani in [place] close to their home for 6:30 AM August 13, 2025 fasting. Spoke to patients wife and gave her all the details.

## 2025-08-06 ENCOUNTER — DOCUMENTATION ONLY (OUTPATIENT)
Dept: HEPATOLOGY | Facility: CLINIC | Age: 55
End: 2025-08-06
Payer: COMMERCIAL

## 2025-08-06 NOTE — LETTER
August 6, 2025    Juwan Martin  [address]  [place] [id number]             [facility]  [address] [place] [address] [address] [place] [address]  Phone: [phone]  Fax: [phone] Dear Juwan Martin    We have received your HelioLiver results and Dr. Lakhani has reviewed them. The results came back normal which means you have a lower likelihood of hepatocellular carcinoma (liver cancer).  I have mailed you the results as well.      Please let me know if you have any further questions.     Sincerely,  Sarah Luke M.A. to John Lakhani MD  [facility]

## 2025-08-06 NOTE — PROGRESS NOTES
Received HelioLiver results as Normal.  Mailed results to patient to address on file.  Sent a message through active patient portal with results to patient.  A copy of the results have been provided to Dr. Lakhani.

## 2025-08-06 NOTE — LETTER
August 6, 2025    Juwan Martin  [address]  [place] [id number]             [facility]  [address] [place] [address] DR [place]ONATHAN [place] [address]  Phone: [phone]  Fax: [phone] Dear Juwan Martin    We have received your [employer] results and Dr. Lakhani has reviewed them. The results came back normal which means you have a lower likelihood of hepatocellular carcinoma (liver cancer).     Please find enclosed copies of your results. If you have any questions or concerns, please don't hesitate to call us at [phone] or send a message through your patient portal.      Sincerely,            Sarah Luke M.A. to Dr. John Lakhani  [facility]

## 2025-08-13 ENCOUNTER — APPOINTMENT (OUTPATIENT)
Dept: RADIOLOGY | Facility: HOSPITAL | Age: 55
End: 2025-08-13
Attending: INTERNAL MEDICINE
Payer: COMMERCIAL

## 2025-08-13 DIAGNOSIS — K76.9 LIVER DISEASE, UNSPECIFIED: ICD-10-CM

## 2025-08-13 PROCEDURE — 25500020 PHARM REV CODE 255: Performed by: INTERNAL MEDICINE

## 2025-08-13 PROCEDURE — 74183 MRI ABD W/O CNTR FLWD CNTR: CPT | Mod: TC

## 2025-08-13 PROCEDURE — A9577 INJ MULTIHANCE: HCPCS | Performed by: INTERNAL MEDICINE

## 2025-08-13 RX ADMIN — GADOBENATE DIMEGLUMINE 15 ML: 529 INJECTION, SOLUTION INTRAVENOUS at 08:08
